# Patient Record
Sex: FEMALE | Race: WHITE | Employment: FULL TIME | URBAN - METROPOLITAN AREA
[De-identification: names, ages, dates, MRNs, and addresses within clinical notes are randomized per-mention and may not be internally consistent; named-entity substitution may affect disease eponyms.]

---

## 2018-03-08 ENCOUNTER — OFFICE VISIT (OUTPATIENT)
Dept: DERMATOLOGY | Age: 48
End: 2018-03-08
Payer: COMMERCIAL

## 2018-03-08 VITALS
BODY MASS INDEX: 29.66 KG/M2 | WEIGHT: 189 LBS | HEART RATE: 75 BPM | HEIGHT: 67 IN | SYSTOLIC BLOOD PRESSURE: 107 MMHG | OXYGEN SATURATION: 99 % | DIASTOLIC BLOOD PRESSURE: 65 MMHG

## 2018-03-08 DIAGNOSIS — L82.1 SEBORRHEIC KERATOSES: ICD-10-CM

## 2018-03-08 DIAGNOSIS — L57.0 KERATOSIS, ACTINIC: Primary | ICD-10-CM

## 2018-03-08 PROCEDURE — 17000 DESTRUCT PREMALG LESION: CPT | Performed by: DERMATOLOGY

## 2018-03-08 PROCEDURE — G8417 CALC BMI ABV UP PARAM F/U: HCPCS | Performed by: DERMATOLOGY

## 2018-03-08 PROCEDURE — 1036F TOBACCO NON-USER: CPT | Performed by: DERMATOLOGY

## 2018-03-08 PROCEDURE — 17003 DESTRUCT PREMALG LES 2-14: CPT | Performed by: DERMATOLOGY

## 2018-03-08 PROCEDURE — G8482 FLU IMMUNIZE ORDER/ADMIN: HCPCS | Performed by: DERMATOLOGY

## 2018-03-08 PROCEDURE — G8427 DOCREV CUR MEDS BY ELIG CLIN: HCPCS | Performed by: DERMATOLOGY

## 2018-03-08 PROCEDURE — 99203 OFFICE O/P NEW LOW 30 MIN: CPT | Performed by: DERMATOLOGY

## 2018-03-08 NOTE — PATIENT INSTRUCTIONS
an antibiotic ointment (such as polysporin) and a Band-Aid if needed. If a large blister develops it is ok to use a clean needle to gently pop the blister. Please call our office with any concerns at [phone]. Sun Protection     There are two types of sun rays that are harmful to the skin. UVA rays cause skin aging and skin cancer, such as melanoma. UVB rays cause sunburns, cataracts, and also contribute to skin cancer. The American-Academy of Dermat[employer] recommends that all kids wear a broad spectrum, waterproof sunscreen with a Sun Protection Factor (SPF) of 30 or higher. It is important to check the ingredient label to be sure the sunscreen will protect the skin from both UVA and UVB sunrays. Your sunscreen should contain at least one of the following ingredients: titanium dioxide, zinc oxide, or avobenzone. Sunscreen will not be effective unless it is applied to all exposed skin. Sunscreens work best if they are applied 30 minutes before sun exposure. They should be reapplied every 2 hours and after any water exposure. Sunscreen is not perfect. It is important to use other methods to protect the skin from sun exposure also. Wear hats, sunglasses and other sun protective clothing when outdoors.   Stay in the shade during the peak hours of sun exposure between 10 AM and 4 PM.

## 2018-03-09 NOTE — PROGRESS NOTES
[facility], and the [facility] have recommended that all patients with AK lesions be evaluated and undergo some form of treatment. Your dermatologist can determine which type of treatment-either alone or in combination-is right for you. SUN PROTECTION AND OBSERVATION  Your dermatologist may recommend that you use a sun block, wear a hat and clothing to prevent sun exposure, and have regular skin examinations. Some AKs go away without further treatment, provided that the skin is not subjected to more sun damage. However, regular examinations will help catch the lesions that need to be treated. LESION-TARGETED THERAPIES   Liquid nitrogen (cryotherapy) destroys AKs by freezing them. This results in blistering and shedding of the AKs. Cryotherapy is the most common treatment when a patient has a few, small AK lesions. Topical chemotherapy is a cream that targets sun-damaged and pre-cancerous cells and destroys them. Cryotherapy    Liquid Nitrogen - \"freeze\" (Cryotherapy)  Your doctor has treated your skin lesions with a very cold substance. The liquid nitrogen is so cold that it may feel like the skin is burning during application. A clear blister or blood blister may form after treatment and may later form a scab. Leave the area alone. Usually this scab will fall of within 1-2 weeks. The area should be kept clean and can be covered with Vaseline or an antibiotic ointment (such as polysporin) and a Band-Aid if needed. If a large blister develops it is ok to use a clean needle to gently pop the blister. Please call our office with any concerns at [phone]. Sun Protection     There are two types of sun rays that are harmful to the skin. UVA rays cause skin aging and skin cancer, such as melanoma. UVB rays cause sunburns, cataracts, and also contribute to skin cancer.     The American-Academy of Dermatology recommends that all kids wear a broad spectrum, waterproof sunscreen

## 2018-04-12 ENCOUNTER — HOSPITAL ENCOUNTER (EMERGENCY)
Age: 48
Discharge: HOME | End: 2018-04-12
Attending: EMERGENCY MEDICINE
Payer: COMMERCIAL

## 2018-04-12 VITALS
HEART RATE: 80 BPM | HEIGHT: 67 IN | DIASTOLIC BLOOD PRESSURE: 65 MMHG | RESPIRATION RATE: 16 BRPM | BODY MASS INDEX: 29.03 KG/M2 | WEIGHT: 185 LBS | TEMPERATURE: 98.8 F | OXYGEN SATURATION: 97 % | SYSTOLIC BLOOD PRESSURE: 105 MMHG

## 2018-04-12 DIAGNOSIS — B34.9 VIRAL SYNDROME: Primary | ICD-10-CM

## 2018-04-12 LAB
-: ABNORMAL
ABSOLUTE EOS #: 0.06 K/UL (ref 0–0.44)
ABSOLUTE IMMATURE GRANULOCYTE: 0.09 K/UL (ref 0–0.3)
ABSOLUTE LYMPH #: 1.72 K/UL (ref 1.1–3.7)
ABSOLUTE MONO #: 0.98 K/UL (ref 0.1–1.2)
ALBUMIN SERPL-MCNC: 3.9 G/DL (ref 3.5–5.2)
ALBUMIN/GLOBULIN RATIO: 0.9 (ref 1–2.5)
ALP BLD-CCNC: 136 U/L (ref 35–104)
ALT SERPL-CCNC: 70 U/L (ref 5–33)
AMORPHOUS: ABNORMAL
ANION GAP SERPL CALCULATED.3IONS-SCNC: 13 MMOL/L (ref 9–17)
APPEARANCE CSF: CLEAR
AST SERPL-CCNC: 43 U/L
BACTERIA: ABNORMAL
BASOPHILS # BLD: 0 % (ref 0–2)
BASOPHILS ABSOLUTE: 0.03 K/UL (ref 0–0.2)
BILIRUB SERPL-MCNC: 1.02 MG/DL (ref 0.3–1.2)
BILIRUBIN DIRECT: 0.21 MG/DL
BILIRUBIN URINE: NEGATIVE
BILIRUBIN, INDIRECT: 0.81 MG/DL (ref 0–1)
BUN BLDV-MCNC: 10 MG/DL (ref 6–20)
BUN/CREAT BLD: ABNORMAL (ref 9–20)
C-REACTIVE PROTEIN: 80 MG/L (ref 0–5)
CALCIUM SERPL-MCNC: 8.9 MG/DL (ref 8.6–10.4)
CASTS UA: ABNORMAL /LPF (ref 0–8)
CHLORIDE BLD-SCNC: 96 MMOL/L (ref 98–107)
CO2: 23 MMOL/L (ref 20–31)
COLOR: ABNORMAL
CREAT SERPL-MCNC: 0.65 MG/DL (ref 0.5–0.9)
CRYPTOCOCCUS NEOFORMANS/GATTI CSF FILM ARR.: NOT DETECTED
CRYSTALS, UA: ABNORMAL /HPF
CULTURE: NORMAL
CULTURE: NORMAL
CYTOMEGALOVIRUS (CMV) CSF FILM ARRAY: NOT DETECTED
DIFFERENTIAL TYPE: ABNORMAL
DIRECT EXAM: NORMAL
ENTEROVIRUS CSF FILM ARRAY: NOT DETECTED
EOSINOPHILS RELATIVE PERCENT: 1 % (ref 1–4)
EPITHELIAL CELLS UA: ABNORMAL /HPF (ref 0–5)
ESCHERICHIA COLI K1 CSF FILM ARRAY: NOT DETECTED
GFR AFRICAN AMERICAN: >60 ML/MIN
GFR NON-AFRICAN AMERICAN: >60 ML/MIN
GFR SERPL CREATININE-BSD FRML MDRD: ABNORMAL ML/MIN/{1.73_M2}
GFR SERPL CREATININE-BSD FRML MDRD: ABNORMAL ML/MIN/{1.73_M2}
GLOBULIN: ABNORMAL G/DL (ref 1.5–3.8)
GLUCOSE BLD-MCNC: 103 MG/DL (ref 70–99)
GLUCOSE URINE: NEGATIVE
GLUCOSE, CSF: 58 MG/DL (ref 40–70)
HAEMOPHILUS INFLUENZA CSF FILM ARRAY: NOT DETECTED
HCG(URINE) PREGNANCY TEST: NEGATIVE
HCT VFR BLD CALC: 39 % (ref 36.3–47.1)
HEMOGLOBIN: 12.8 G/DL (ref 11.9–15.1)
HHV-6 (HERPESVIRUS 6) CSF FILM ARRAY: NOT DETECTED
HSV-1 CSF FILM ARRAY: NOT DETECTED
HSV-2 CSF FILM ARRAY: NOT DETECTED
IMMATURE GRANULOCYTES: 1 %
KETONES, URINE: ABNORMAL
LEUKOCYTE ESTERASE, URINE: ABNORMAL
LISTERIA MONOCYTOGENES CSF FILM ARRAY: NOT DETECTED
LYMPHOCYTES # BLD: 17 % (ref 24–43)
Lab: NORMAL
MAGNESIUM: 2.3 MG/DL (ref 1.6–2.6)
MCH RBC QN AUTO: 28.6 PG (ref 25.2–33.5)
MCHC RBC AUTO-ENTMCNC: 32.8 G/DL (ref 28.4–34.8)
MCV RBC AUTO: 87.2 FL (ref 82.6–102.9)
MONOCYTES # BLD: 10 % (ref 3–12)
MUCUS: ABNORMAL
MYOGLOBIN: <21 NG/ML (ref 25–58)
NEISSERIA MENIGITIDIS CSF FILM ARRAY: NOT DETECTED
NITRITE, URINE: NEGATIVE
NRBC AUTOMATED: 0 PER 100 WBC
OTHER OBSERVATIONS UA: ABNORMAL
PARECHOVIRUS CSF FILM ARRAY: NOT DETECTED
PDW BLD-RTO: 11.6 % (ref 11.8–14.4)
PH UA: 5 (ref 5–8)
PLATELET # BLD: 289 K/UL (ref 138–453)
PLATELET ESTIMATE: ABNORMAL
PMV BLD AUTO: 10 FL (ref 8.1–13.5)
POTASSIUM SERPL-SCNC: 4 MMOL/L (ref 3.7–5.3)
PROTEIN CSF: 31.4 MG/DL (ref 15–45)
PROTEIN UA: ABNORMAL
RBC # BLD: 4.47 M/UL (ref 3.95–5.11)
RBC # BLD: ABNORMAL 10*6/UL
RBC CSF: 2 /MM3
RBC UA: ABNORMAL /HPF (ref 0–4)
RENAL EPITHELIAL, UA: ABNORMAL /HPF
SEG NEUTROPHILS: 71 % (ref 36–65)
SEGMENTED NEUTROPHILS ABSOLUTE COUNT: 7.11 K/UL (ref 1.5–8.1)
SODIUM BLD-SCNC: 132 MMOL/L (ref 135–144)
SOURCE: NORMAL
SPECIFIC GRAVITY UA: 1.02 (ref 1–1.03)
SPECIMEN DESCRIPTION: NORMAL
STATUS: NORMAL
STREPTOCOCCUS AGALACTIAE CSF FILM ARRAY: NOT DETECTED
STREPTOCOCCUS PNEUMONIAE CSF FILM ARRAY: NOT DETECTED
SUPERNAT COLOR CSF: ABNORMAL
TOTAL CK: 45 U/L (ref 26–192)
TOTAL PROTEIN: 8.4 G/DL (ref 6.4–8.3)
TRICHOMONAS: ABNORMAL
TUBE NUMBER CSF: 4
TURBIDITY: CLEAR
URINE HGB: ABNORMAL
UROBILINOGEN, URINE: NORMAL
VARICELLA-ZOSTER CSF FILM ARRAY: NOT DETECTED
VOLUME CSF: 6
WBC # BLD: 10 K/UL (ref 3.5–11.3)
WBC # BLD: ABNORMAL 10*3/UL
WBC CSF: 0 /MM3
WBC UA: ABNORMAL /HPF (ref 0–5)
XANTHOCHROMIA: ABNORMAL
YEAST: ABNORMAL

## 2018-04-12 PROCEDURE — 86140 C-REACTIVE PROTEIN: CPT

## 2018-04-12 PROCEDURE — 80076 HEPATIC FUNCTION PANEL: CPT

## 2018-04-12 PROCEDURE — 81001 URINALYSIS AUTO W/SCOPE: CPT

## 2018-04-12 PROCEDURE — 87070 CULTURE OTHR SPECIMN AEROBIC: CPT

## 2018-04-12 PROCEDURE — 86654 ENCEPHALTIS WEST EQNE ANTBDY: CPT

## 2018-04-12 PROCEDURE — 86653 ENCEPHALTIS ST LOUIS ANTBODY: CPT

## 2018-04-12 PROCEDURE — 86788 WEST NILE VIRUS AB IGM: CPT

## 2018-04-12 PROCEDURE — 84157 ASSAY OF PROTEIN OTHER: CPT

## 2018-04-12 PROCEDURE — 89050 BODY FLUID CELL COUNT: CPT

## 2018-04-12 PROCEDURE — 86652 ENCEPHALTIS EAST EQNE ANBDY: CPT

## 2018-04-12 PROCEDURE — 2580000003 HC RX 258: Performed by: EMERGENCY MEDICINE

## 2018-04-12 PROCEDURE — 86789 WEST NILE VIRUS ANTIBODY: CPT

## 2018-04-12 PROCEDURE — 85025 COMPLETE CBC W/AUTO DIFF WBC: CPT

## 2018-04-12 PROCEDURE — 83874 ASSAY OF MYOGLOBIN: CPT

## 2018-04-12 PROCEDURE — 87015 SPECIMEN INFECT AGNT CONCNTJ: CPT

## 2018-04-12 PROCEDURE — 80048 BASIC METABOLIC PNL TOTAL CA: CPT

## 2018-04-12 PROCEDURE — 96374 THER/PROPH/DIAG INJ IV PUSH: CPT

## 2018-04-12 PROCEDURE — 2500000003 HC RX 250 WO HCPCS: Performed by: EMERGENCY MEDICINE

## 2018-04-12 PROCEDURE — 83735 ASSAY OF MAGNESIUM: CPT

## 2018-04-12 PROCEDURE — 84703 CHORIONIC GONADOTROPIN ASSAY: CPT

## 2018-04-12 PROCEDURE — 87205 SMEAR GRAM STAIN: CPT

## 2018-04-12 PROCEDURE — 96361 HYDRATE IV INFUSION ADD-ON: CPT

## 2018-04-12 PROCEDURE — 62270 DX LMBR SPI PNXR: CPT

## 2018-04-12 PROCEDURE — 6360000002 HC RX W HCPCS: Performed by: EMERGENCY MEDICINE

## 2018-04-12 PROCEDURE — 86651 ENCEPHALITIS CALIFORN ANTBDY: CPT

## 2018-04-12 PROCEDURE — 82550 ASSAY OF CK (CPK): CPT

## 2018-04-12 PROCEDURE — 82945 GLUCOSE OTHER FLUID: CPT

## 2018-04-12 PROCEDURE — S0028 INJECTION, FAMOTIDINE, 20 MG: HCPCS | Performed by: EMERGENCY MEDICINE

## 2018-04-12 PROCEDURE — 99283 EMERGENCY DEPT VISIT LOW MDM: CPT

## 2018-04-12 PROCEDURE — 6370000000 HC RX 637 (ALT 250 FOR IP): Performed by: EMERGENCY MEDICINE

## 2018-04-12 PROCEDURE — 87483 CNS DNA AMP PROBE TYPE 12-25: CPT

## 2018-04-12 RX ORDER — IBUPROFEN 800 MG/1
800 TABLET ORAL ONCE
Status: COMPLETED | OUTPATIENT
Start: 2018-04-12 | End: 2018-04-12

## 2018-04-12 RX ORDER — ONDANSETRON 4 MG/1
4 TABLET, FILM COATED ORAL EVERY 8 HOURS PRN
Qty: 5 TABLET | Refills: 0 | Status: SHIPPED | OUTPATIENT
Start: 2018-04-12 | End: 2018-05-11 | Stop reason: ALTCHOICE

## 2018-04-12 RX ORDER — IBUPROFEN 600 MG/1
600 TABLET ORAL EVERY 6 HOURS PRN
Qty: 30 TABLET | Refills: 0 | Status: SHIPPED | OUTPATIENT
Start: 2018-04-12 | End: 2020-10-01 | Stop reason: CLARIF

## 2018-04-12 RX ORDER — 0.9 % SODIUM CHLORIDE 0.9 %
1000 INTRAVENOUS SOLUTION INTRAVENOUS ONCE
Status: COMPLETED | OUTPATIENT
Start: 2018-04-12 | End: 2018-04-12

## 2018-04-12 RX ORDER — LIDOCAINE HYDROCHLORIDE 10 MG/ML
INJECTION, SOLUTION INFILTRATION; PERINEURAL
Status: DISCONTINUED
Start: 2018-04-12 | End: 2018-04-12 | Stop reason: HOSPADM

## 2018-04-12 RX ORDER — ONDANSETRON 4 MG/1
4 TABLET, FILM COATED ORAL ONCE
Status: COMPLETED | OUTPATIENT
Start: 2018-04-12 | End: 2018-04-12

## 2018-04-12 RX ADMIN — IBUPROFEN 800 MG: 800 TABLET ORAL at 13:10

## 2018-04-12 RX ADMIN — SODIUM CHLORIDE 1000 ML: 9 INJECTION, SOLUTION INTRAVENOUS at 13:12

## 2018-04-12 RX ADMIN — FAMOTIDINE 20 MG: 10 INJECTION, SOLUTION INTRAVENOUS at 13:10

## 2018-04-12 RX ADMIN — ONDANSETRON 4 MG: 4 TABLET, FILM COATED ORAL at 13:10

## 2018-04-12 ASSESSMENT — ENCOUNTER SYMPTOMS
VOMITING: 0
COUGH: 0
BACK PAIN: 1
SORE THROAT: 0
ABDOMINAL PAIN: 0
SHORTNESS OF BREATH: 0
RHINORRHEA: 0
NAUSEA: 0

## 2018-04-12 ASSESSMENT — PAIN DESCRIPTION - ORIENTATION: ORIENTATION: RIGHT;LOWER

## 2018-04-12 ASSESSMENT — PAIN DESCRIPTION - DESCRIPTORS: DESCRIPTORS: ACHING;DISCOMFORT

## 2018-04-12 ASSESSMENT — PAIN SCALES - GENERAL
PAINLEVEL_OUTOF10: 3
PAINLEVEL_OUTOF10: 7

## 2018-04-12 ASSESSMENT — PAIN DESCRIPTION - LOCATION: LOCATION: BACK

## 2018-04-12 ASSESSMENT — PAIN DESCRIPTION - PAIN TYPE: TYPE: ACUTE PAIN

## 2018-04-15 LAB
CULTURE: ABNORMAL
CULTURE: ABNORMAL
DIRECT EXAM: ABNORMAL
Lab: ABNORMAL
SPECIMEN DESCRIPTION: ABNORMAL
STATUS: ABNORMAL

## 2018-04-16 LAB
SEND OUT REPORT: NORMAL
TEST NAME: NORMAL

## 2018-05-14 ENCOUNTER — HOSPITAL ENCOUNTER (OUTPATIENT)
Dept: WOMENS IMAGING | Age: 48
Discharge: HOME | End: 2018-05-16
Payer: COMMERCIAL

## 2018-05-14 DIAGNOSIS — Z12.39 BREAST CANCER SCREENING: ICD-10-CM

## 2018-05-14 DIAGNOSIS — Z13.6 SCREENING FOR CARDIOVASCULAR CONDITION: ICD-10-CM

## 2018-05-14 PROCEDURE — 77063 BREAST TOMOSYNTHESIS BI: CPT

## 2018-05-15 ENCOUNTER — HOSPITAL ENCOUNTER (OUTPATIENT)
Age: 48
Discharge: HOME | End: 2018-05-15
Payer: COMMERCIAL

## 2018-05-15 LAB
ALBUMIN SERPL-MCNC: 4.2 G/DL (ref 3.5–5.2)
ALBUMIN/GLOBULIN RATIO: ABNORMAL (ref 1–2.5)
ALP BLD-CCNC: 49 U/L (ref 35–104)
ALT SERPL-CCNC: 19 U/L (ref 5–33)
ANION GAP SERPL CALCULATED.3IONS-SCNC: 10 MMOL/L (ref 9–17)
AST SERPL-CCNC: 18 U/L
BILIRUB SERPL-MCNC: 0.85 MG/DL (ref 0.3–1.2)
BUN BLDV-MCNC: 14 MG/DL (ref 6–20)
BUN/CREAT BLD: ABNORMAL (ref 9–20)
CALCIUM SERPL-MCNC: 8.9 MG/DL (ref 8.6–10.4)
CHLORIDE BLD-SCNC: 104 MMOL/L (ref 98–107)
CHOLESTEROL/HDL RATIO: 2.2
CHOLESTEROL: 134 MG/DL
CO2: 25 MMOL/L (ref 20–31)
CREAT SERPL-MCNC: 0.69 MG/DL (ref 0.5–0.9)
GFR AFRICAN AMERICAN: >60 ML/MIN
GFR NON-AFRICAN AMERICAN: >60 ML/MIN
GFR SERPL CREATININE-BSD FRML MDRD: ABNORMAL ML/MIN/{1.73_M2}
GFR SERPL CREATININE-BSD FRML MDRD: ABNORMAL ML/MIN/{1.73_M2}
GLUCOSE BLD-MCNC: 104 MG/DL (ref 70–99)
HCT VFR BLD CALC: 40 % (ref 36–46)
HDLC SERPL-MCNC: 60 MG/DL
HEMOGLOBIN: 13.6 G/DL (ref 12–16)
LDL CHOLESTEROL: 58 MG/DL (ref 0–130)
MCH RBC QN AUTO: 30 PG (ref 26–34)
MCHC RBC AUTO-ENTMCNC: 33.9 G/DL (ref 31–37)
MCV RBC AUTO: 88.6 FL (ref 80–100)
NRBC AUTOMATED: NORMAL PER 100 WBC
PDW BLD-RTO: 13.7 % (ref 11.5–14.9)
PLATELET # BLD: 190 K/UL (ref 150–450)
PMV BLD AUTO: 8.3 FL (ref 6–12)
POTASSIUM SERPL-SCNC: 4.3 MMOL/L (ref 3.7–5.3)
RBC # BLD: 4.52 M/UL (ref 4–5.2)
SODIUM BLD-SCNC: 139 MMOL/L (ref 135–144)
TOTAL PROTEIN: 7.1 G/DL (ref 6.4–8.3)
TRIGL SERPL-MCNC: 82 MG/DL
VLDLC SERPL CALC-MCNC: NORMAL MG/DL (ref 1–30)
WBC # BLD: 5.3 K/UL (ref 3.5–11)

## 2018-05-15 PROCEDURE — 80053 COMPREHEN METABOLIC PANEL: CPT

## 2018-05-15 PROCEDURE — 36415 COLL VENOUS BLD VENIPUNCTURE: CPT

## 2018-05-15 PROCEDURE — 80061 LIPID PANEL: CPT

## 2018-05-15 PROCEDURE — 85027 COMPLETE CBC AUTOMATED: CPT

## 2019-05-14 ENCOUNTER — HOSPITAL ENCOUNTER (OUTPATIENT)
Age: 49
Setting detail: SPECIMEN
Discharge: HOME | End: 2019-05-14
Payer: COMMERCIAL

## 2019-05-15 LAB
C DIFF AG + TOXIN: NEGATIVE
CAMPYLOBACTER PCR: NORMAL
E COLI ENTEROTOXIGENIC PCR: NORMAL
PLESIOMONAS SHIGELLOIDES PCR: NORMAL
SALMONELLA PCR: NORMAL
SHIGATOXIN GENE PCR: NORMAL
SHIGELLA SP PCR: NORMAL
SPECIMEN DESCRIPTION: NORMAL
SPECIMEN DESCRIPTION: NORMAL
VIBRIO PCR: NORMAL
YERSINIA ENTEROCOLITICA PCR: NORMAL

## 2020-07-21 ENCOUNTER — HOSPITAL ENCOUNTER (OUTPATIENT)
Facility: CLINIC | Age: 50
Discharge: HOME | End: 2020-07-23
Payer: COMMERCIAL

## 2020-07-21 ENCOUNTER — HOSPITAL ENCOUNTER (OUTPATIENT)
Age: 50
Setting detail: SPECIMEN
Discharge: HOME | End: 2020-07-21
Payer: COMMERCIAL

## 2020-07-21 ENCOUNTER — HOSPITAL ENCOUNTER (OUTPATIENT)
Dept: GENERAL RADIOLOGY | Facility: CLINIC | Age: 50
Discharge: HOME | End: 2020-07-23
Payer: COMMERCIAL

## 2020-07-21 LAB
ABSOLUTE EOS #: 0.1 K/UL (ref 0–0.44)
ABSOLUTE IMMATURE GRANULOCYTE: <0.03 K/UL (ref 0–0.3)
ABSOLUTE LYMPH #: 1.92 K/UL (ref 1.1–3.7)
ABSOLUTE MONO #: 0.47 K/UL (ref 0.1–1.2)
ALBUMIN SERPL-MCNC: 4.4 G/DL (ref 3.5–5.2)
ALBUMIN/GLOBULIN RATIO: 1.6 (ref 1–2.5)
ALP BLD-CCNC: 52 U/L (ref 35–104)
ALT SERPL-CCNC: 15 U/L (ref 5–33)
ANION GAP SERPL CALCULATED.3IONS-SCNC: 10 MMOL/L (ref 9–17)
AST SERPL-CCNC: 19 U/L
BASOPHILS # BLD: 1 % (ref 0–2)
BASOPHILS ABSOLUTE: 0.04 K/UL (ref 0–0.2)
BILIRUB SERPL-MCNC: 1.04 MG/DL (ref 0.3–1.2)
BUN BLDV-MCNC: 15 MG/DL (ref 6–20)
BUN/CREAT BLD: NORMAL (ref 9–20)
C-REACTIVE PROTEIN: 0.8 MG/L (ref 0–5)
CALCIUM SERPL-MCNC: 9.1 MG/DL (ref 8.6–10.4)
CHLORIDE BLD-SCNC: 105 MMOL/L (ref 98–107)
CHOLESTEROL, FASTING: 143 MG/DL
CHOLESTEROL/HDL RATIO: 2.6
CO2: 23 MMOL/L (ref 20–31)
CREAT SERPL-MCNC: 0.71 MG/DL (ref 0.5–0.9)
DIFFERENTIAL TYPE: NORMAL
EOSINOPHILS RELATIVE PERCENT: 2 % (ref 1–4)
ESTIMATED AVERAGE GLUCOSE: 114 MG/DL
GFR AFRICAN AMERICAN: >60 ML/MIN
GFR NON-AFRICAN AMERICAN: >60 ML/MIN
GFR SERPL CREATININE-BSD FRML MDRD: NORMAL ML/MIN/{1.73_M2}
GFR SERPL CREATININE-BSD FRML MDRD: NORMAL ML/MIN/{1.73_M2}
GLUCOSE BLD-MCNC: 92 MG/DL (ref 70–99)
HBA1C MFR BLD: 5.6 % (ref 4–6)
HCT VFR BLD CALC: 44.2 % (ref 36.3–47.1)
HDLC SERPL-MCNC: 55 MG/DL
HEMOGLOBIN: 14.5 G/DL (ref 11.9–15.1)
IMMATURE GRANULOCYTES: 0 %
LDL CHOLESTEROL: 72 MG/DL (ref 0–130)
LYMPHOCYTES # BLD: 35 % (ref 24–43)
MCH RBC QN AUTO: 29.8 PG (ref 25.2–33.5)
MCHC RBC AUTO-ENTMCNC: 32.8 G/DL (ref 28.4–34.8)
MCV RBC AUTO: 90.9 FL (ref 82.6–102.9)
MONOCYTES # BLD: 9 % (ref 3–12)
NRBC AUTOMATED: 0 PER 100 WBC
PDW BLD-RTO: 11.9 % (ref 11.8–14.4)
PLATELET # BLD: 214 K/UL (ref 138–453)
PLATELET ESTIMATE: NORMAL
PMV BLD AUTO: 10.7 FL (ref 8.1–13.5)
POTASSIUM SERPL-SCNC: 4.7 MMOL/L (ref 3.7–5.3)
RBC # BLD: 4.86 M/UL (ref 3.95–5.11)
RBC # BLD: NORMAL 10*6/UL
RHEUMATOID FACTOR: 10.8 IU/ML
SEDIMENTATION RATE, ERYTHROCYTE: 16 MM (ref 0–30)
SEG NEUTROPHILS: 53 % (ref 36–65)
SEGMENTED NEUTROPHILS ABSOLUTE COUNT: 2.93 K/UL (ref 1.5–8.1)
SODIUM BLD-SCNC: 138 MMOL/L (ref 135–144)
TOTAL PROTEIN: 7.2 G/DL (ref 6.4–8.3)
TRIGLYCERIDE, FASTING: 81 MG/DL
VLDLC SERPL CALC-MCNC: NORMAL MG/DL (ref 1–30)
WBC # BLD: 5.5 K/UL (ref 3.5–11.3)
WBC # BLD: NORMAL 10*3/UL

## 2020-07-21 PROCEDURE — 73030 X-RAY EXAM OF SHOULDER: CPT

## 2020-07-22 LAB — ANTI-NUCLEAR ANTIBODY (ANA): NEGATIVE

## 2020-07-23 LAB — LYME ANTIBODY: 0.97

## 2020-07-25 LAB
LYME IGM WB: NEGATIVE
LYME WESTERN BLOT IGG: NEGATIVE

## 2020-08-11 ENCOUNTER — OFFICE VISIT (OUTPATIENT)
Dept: SURGERY | Age: 50
End: 2020-08-11

## 2020-08-11 VITALS
BODY MASS INDEX: 28.25 KG/M2 | WEIGHT: 180 LBS | SYSTOLIC BLOOD PRESSURE: 107 MMHG | DIASTOLIC BLOOD PRESSURE: 71 MMHG | HEIGHT: 67 IN

## 2020-08-11 PROCEDURE — 99999 PR OFFICE/OUTPT VISIT,PROCEDURE ONLY: CPT | Performed by: SURGERY

## 2020-08-11 RX ORDER — SODIUM, POTASSIUM,MAG SULFATES 17.5-3.13G
SOLUTION, RECONSTITUTED, ORAL ORAL
Qty: 1 KIT | Refills: 0 | Status: SHIPPED | OUTPATIENT
Start: 2020-08-11 | End: 2020-10-01 | Stop reason: CLARIF

## 2020-08-11 ASSESSMENT — ENCOUNTER SYMPTOMS
RESPIRATORY NEGATIVE: 1
DIARRHEA: 1

## 2020-08-11 NOTE — H&P
Dashawn De Los Santos [address] [place], [address]        Loc Jack   47 y.o.  1970  [id number]     PCP: ANDREW Sy - CNP  Referring Provider: Danielle Prasad*   Author:   Dr. Alvarez Corrales MD     Reason for Visit:  Chief Complaint   Patient presents with    New Patient     colon cancer screening       HPI:  Loc Jack presents in evaluation for Screening Colonoscopy. Denies any new colon-related problems. She does report a longstanding history of IBS with limited diarrhea each morning. Prior colonoscopy was 10+ years ago. Review of Systems   Constitutional: Negative. Respiratory: Negative. Cardiovascular: Negative. Gastrointestinal: Positive for diarrhea. Musculoskeletal: Negative. Neurological: Negative. Allergies: Allergies   Allergen Reactions    Pcn [Penicillins]        Current Medications:  Current Outpatient Medications   Medication Sig Dispense Refill    SUPREP BOWEL PREP KIT 17.5-3.13-1.6 GM/177ML SOLN Take as directed - dispense one Kit 1 kit 0    albuterol sulfate HFA (PROVENTIL HFA) 108 (90 Base) MCG/ACT inhaler Inhale 2 puffs into the lungs every 4 hours as needed for Wheezing 1 Inhaler 1    cyclobenzaprine (FLEXERIL) 5 MG tablet 1 - 2 po tid prn muscle spasm (Patient not taking: Reported on 7/20/2020) 30 tablet 0    ibuprofen (ADVIL;MOTRIN) 600 MG tablet Take 1 tablet by mouth every 6 hours as needed for Pain (Patient not taking: Reported on 8/11/2020) 30 tablet 0     No current facility-administered medications for this visit. Problem List:  Patient Active Problem List   Diagnosis    Depression    Irritable bowel syndrome with diarrhea    Overweight (BMI 25.0-29. 9)    Mild persistent asthma without complication        Histories:  Past Medical History:   Diagnosis Date    Asthma 12/16/2014    Depression     Irritable bowel syndrome with diarrhea 6/29/2016 No family history on file. No past surgical history on file. Social History     Socioeconomic History    Marital status:      Spouse name: None    Number of children: None    Years of education: None    Highest education level: None   Occupational History    None   Social Needs    Financial resource strain: Not hard at all   Lamin-Lindsey insecurity     Worry: Never true     Inability: Never true    Transportation needs     Medical: No     Non-medical: No   Tobacco Use    Smoking status: Never Smoker    Smokeless tobacco: Never Used   Substance and Sexual Activity    Alcohol use: No    Drug use: No    Sexual activity: None   Lifestyle    Physical activity     Days per week: None     Minutes per session: None    Stress: None   Relationships    Social connections     Talks on phone: None     Gets together: None     Attends Tenriism service: None     Active member of club or organization: None     Attends meetings of clubs or organizations: None     Relationship status: None    Intimate partner violence     Fear of current or ex partner: None     Emotionally abused: None     Physically abused: None     Forced sexual activity: None   Other Topics Concern    None   Social History Narrative    None        Physical Examination:  /71 (Site: Left Upper Arm, Position: Sitting, Cuff Size: Medium Adult)   Ht 5' 7\" (1.702 m)   Wt 180 lb (81.6 kg)   BMI 28.19 kg/m²     Neck: Supple  Respiratory:  Lungs are clear to auscultation  Cardiovascular:  Normal Rate and Rhythm  Gastrointestinal:    Abdomen:  Soft, non-tender, no masses   Rectum/Anus:  Deferred to procedure  Integumentary:  Warm and dry  Neurologic:  Alert     Impression and Plan:    Diagnoses:     Diagnosis Orders   1. Encounter for screening colonoscopy        Plan:  Colonoscopy.   The risks, benefits, options and potential complications of the procedure were discussed in detail with the patient and they agreed to proceed and consent was

## 2020-08-24 ENCOUNTER — OFFICE VISIT (OUTPATIENT)
Dept: ORTHOPEDIC SURGERY | Age: 50
End: 2020-08-24
Payer: COMMERCIAL

## 2020-08-24 VITALS — TEMPERATURE: 97.5 F | HEIGHT: 67 IN | BODY MASS INDEX: 28.25 KG/M2 | WEIGHT: 180 LBS

## 2020-08-24 PROCEDURE — 20610 DRAIN/INJ JOINT/BURSA W/O US: CPT | Performed by: ORTHOPAEDIC SURGERY

## 2020-08-24 PROCEDURE — 99203 OFFICE O/P NEW LOW 30 MIN: CPT | Performed by: ORTHOPAEDIC SURGERY

## 2020-08-24 RX ORDER — LIDOCAINE HYDROCHLORIDE 10 MG/ML
3 INJECTION, SOLUTION INFILTRATION; PERINEURAL ONCE
Status: COMPLETED | OUTPATIENT
Start: 2020-08-24 | End: 2020-08-24

## 2020-08-24 RX ORDER — TRIAMCINOLONE ACETONIDE 40 MG/ML
40 INJECTION, SUSPENSION INTRA-ARTICULAR; INTRAMUSCULAR ONCE
Status: COMPLETED | OUTPATIENT
Start: 2020-08-24 | End: 2020-08-24

## 2020-08-24 RX ADMIN — LIDOCAINE HYDROCHLORIDE 3 ML: 10 INJECTION, SOLUTION INFILTRATION; PERINEURAL at 16:45

## 2020-08-24 RX ADMIN — TRIAMCINOLONE ACETONIDE 40 MG: 40 INJECTION, SUSPENSION INTRA-ARTICULAR; INTRAMUSCULAR at 16:45

## 2020-08-26 ENCOUNTER — HOSPITAL ENCOUNTER (OUTPATIENT)
Dept: PHYSICAL THERAPY | Age: 50
Setting detail: THERAPIES SERIES
Discharge: HOME | End: 2020-08-26
Payer: COMMERCIAL

## 2020-08-26 PROCEDURE — 97162 PT EVAL MOD COMPLEX 30 MIN: CPT

## 2020-08-26 PROCEDURE — 97110 THERAPEUTIC EXERCISES: CPT

## 2020-08-26 ASSESSMENT — PAIN DESCRIPTION - DESCRIPTORS: DESCRIPTORS: CONSTANT;ACHING;NUMBNESS;TINGLING

## 2020-08-26 ASSESSMENT — PAIN DESCRIPTION - PAIN TYPE: TYPE: ACUTE PAIN

## 2020-08-26 ASSESSMENT — PAIN DESCRIPTION - LOCATION: LOCATION: ELBOW;SHOULDER

## 2020-08-26 ASSESSMENT — 9 HOLE PEG TEST
TESTTIME_SECONDS: 19
TESTTIME_SECONDS: 17

## 2020-08-26 ASSESSMENT — PAIN DESCRIPTION - ONSET: ONSET: SUDDEN

## 2020-08-26 ASSESSMENT — PAIN DESCRIPTION - PROGRESSION: CLINICAL_PROGRESSION: GRADUALLY IMPROVING

## 2020-08-26 ASSESSMENT — PAIN DESCRIPTION - ORIENTATION: ORIENTATION: RIGHT

## 2020-08-26 ASSESSMENT — PAIN SCALES - GENERAL: PAINLEVEL_OUTOF10: 2

## 2020-08-26 NOTE — PROGRESS NOTES
Physical Therapy  Initial Assessment  Date: 2020  Patient Name: Celso Archibald  MRN: [id number]  : 1970     Treatment Diagnosis: Pain M25.511 M25.521   Stiffness M25.611   Weakness M62.511  M62.541   Tingling/ numbness R20.2  Subjective   General  Chart Reviewed: Yes  Patient assessed for rehabilitation services?: Yes  Additional Pertinent Hx: Being tested for connective tissue disorder  Referring Practitioner: Dr. Colby Crystal  Referral Date : 20  Diagnosis: Right rotator cuff tendinits M75.81  Follows Commands: Within Functional Limits  Other (Comment): BREEZY Gayle Dr's appt. General Comment  Comments: Woke up with pain on R shoulder/ elbow, states she's very active. Can't pinpoint a specific activity that could cause her problem. PT Visit Information  Onset Date: 20  PT Insurance Information: [employer] - 20 visits  Total # of Visits Approved: 12  Total # of Visits to Date: 1  Subjective  Subjective: Complains of pain and limited motion of R shoulder, can't lift arm up, can't go back far, difficulty dressing. Complains of R elbow pain, numbness/ tingling on ulnar hand  Pain Screening  Patient Currently in Pain: Yes  Pain Assessment  Pain Assessment: 0-10  Pain Level: 2(can go up to 7/10 with movement)  Pain Type: Acute pain  Pain Location: Elbow; Shoulder  Pain Orientation: Right  Pain Descriptors: Constant; Aching;Numbness;Tingling  Pain Onset: Sudden  Clinical Progression: Gradually improving(shoulder - improving since the shot 2 days ago; elbow - no change)  Non-Pharmaceutical Pain Intervention(s): (has not tried anything to help relieve the pain)  Response to Pain Intervention: None  Vital Signs  Patient Currently in Pain: Yes    Vision/Hearing  Vision  Vision: (wears glasses)  Hearing  Hearing: Within functional limits  Social/Functional History  Social/Functional History  Lives With: Spouse  Type of Home: House  ADL Assistance: Independent(occasionally asks her  to pull her shirt off)  Homemaking Responsibilities: Yes  Active : Yes  Occupation: Full time employment  Type of occupation: Teacher  Leisure & Hobbies: Horseback riding ( hard to put on saddle ), moving hay cristo  Additional Comments: Patient is L handed  Objective     Observation/Palpation  Palpation: Nontender upon palpation of R shoulder, elbow    AROM RUE (degrees)  RUE AROM : Exceptions  R Shoulder Flexion 0-180: 0 / 125  R Shoulder Extension 0-45: 0 / 55  R Shoulder ABduction 0-180: 0 / 120, slow movement  R Shoulder Int Rotation  0-70: 0 / 55 in 90 ABD, thumb on contralateral scapula vertebral border  R Shoulder Ext Rotation 0-90: 0 / 55 in 90 ABD , fingers on C7  R Elbow Flexion 0-145: WNL  R Forearm Supination  0-90: WNL  R Wrist Flexion 0-80: WNL  R Wrist Extension 0-70: WNL  AROM LUE (degrees)  LUE AROM : WNL  Spine  Cervical: WFL, limited with SB, rotation - reports crepitation    Strength RUE  Strength RUE: Exception  Comment: R scapular stabilizers 3+-4/5  R Shoulder Flexion: 4/5;4+/5  R Shoulder ABduction: 4/5  R Shoulder Internal Rotation: 4/5  R Shoulder External Rotation: 4/5  R Elbow Flexion: 4+/5  R Elbow Extension: 4+/5  R Wrist Flexion: 4+/5;5/5  R Wrist Extension: 4+/5;5/5  Strength LUE  Strength LUE: WNL  Comment: Grossly 5/5 overall     Additional Measures  Special Tests: ( - ) R Drop arm test, Rosas - Caden test ( states these were painful before the cortisone shot ); ( + ) sustained R elbow flexion. Impaired R scapulo-humeral rhythm, noted increased scapular elevation with shoulder flex/abduction   Exercises  Exercise 1: INstructed on ulnar nerve protection principle - proper positioning when sleeping,ADL modification to avoid repetitive elbow flexion/ lifting, avoid pressure on medial elbow  Exercise 2: Scaption with thumb up/ down 5x@;  Active shoulder flexion 5x ; posterior capsule stretch 15\"x2 - continue as HEP   Hand Dominance  Hand Dominance: Left  Left Hand Strength -  (lbs)  Handle Setting 2: 65#, 65# - over 90th percentile for age/ gender  Left Hand Strength - Pinch (lbs)  Lateral: 12  Tip: 10  Palmar 3 point: 13  Right Hand Strength -  (lbs)  Handle Setting 2: 40#, 25# - with pain ( Evans 32.5# ) - below 10thpercentile for age/ gender  Right Hand Strength - Pinch (lbs)  Lateral: 9  Tip: 8  Palmar 3 point: 8  Fine Motor Skills  Left 9 Hole Peg Test Time (secs): 17  Right 9 Hole Peg Test Time (secs): 19  Assessment   Conditions Requiring Skilled Therapeutic Intervention  Body structures, Functions, Activity limitations: Decreased functional mobility ; Decreased ADL status; Decreased ROM; Increased pain;Decreased strength  Assessment: Noted full passive ROM of R shoulder in all planes, no capsular tightness noted. Exhibiting increased numbness/ tingling on R ulnar hand with sustained elbow flexion. Weak R scapular stabilizers. Will benefit from manual therapy, therapeutic exercise and pain modalities PRN to R shoulder. Will monitor symptoms on R elbow with ADL modification and positioning. Treatment Diagnosis: Pain M25.511 M25.521   Stiffness M25.611   Weakness M62.511  M62.541   Tingling/ numbness R20.2  Prognosis: Good  Decision Making: Medium Complexity  History: R elbow pain may affect progression of exercises to R shoulder; being tested for connective tissue disorder  Exam: Pain, ROM, MMT, dynamometry, coordination , provocative tests, UEFS  Clinical Presentation: Evolving  Barriers to Learning: None  REQUIRES PT FOLLOW UP: Yes  Treatment Initiated : Therapeutic exercise, instruction in HEP/ nerve protection. Copy on file for written instructions of HEP. good understanding.  Discussed treatment plan   Plan   Plan  Times per week: 2x/wk for 12 visits ( patient would like to limit visits as she has only 20 vs/ year )  Specific instructions for Next Treatment: PROM/scapular stabilization ex, RC strengthening, avoid repetitive elbow flexion; ice/IFC to R shoulder PRN  Current Treatment Recommendations: Strengthening, Home Exercise Program, ROM, Safety Education & Training, Patient/Caregiver Education & Training, Manual Therapy - Joint Manipulation, Modalities, Positioning  Plan Comment: Initiate treatment plan  OutComes Score  UEFS Score: 48.75 (08/26/20 1500)    Goals  Short term goals  Time Frame for Short term goals: 6 visits  Short term goal 1: Decrease pain to 1-2/10 to improve sleep and tolerance to daily activities  Short term goal 2: Improve R shoulder ROM by 10 degrees to improve dressing, performance of ADL's  Short term goal 3:  Independent with HEP/ nerve protection  Long term goals  Time Frame for Long term goals : 12 visits  Long term goal 1: Improve strength of RUE to 5/5 for safe lifting and carrying and improve confidence in using her RUE  Long term goal 2: Improve R / prehension strength to be able to grasp objects/ open jars without difficulty  Long term goal 3: Improve function in UEFS by 10 points from 48.75 = 39% disability  Patient Goals   Patient goals : \" No pain\"   Therapy Time   Individual Concurrent Group Co-treatment   Time In 1500         Time Out 1605         Minutes 65         Timed Code Treatment Minutes: 20 Minutes     Treatment Charges: Minutes Units   []  Ultrasound     []  Electrical-Stim     []  Iontophoresis     []  Traction     []  Massage       [x]  Eval 40 1   []  Gait     [x]  Ther Exercise 20  1    []  Manual Therapy       []  Ther Activities       []  Aquatics     []  Vasopneumatic Device     []  Neuro Re-Ed       []  Other       Total Treatment Time: [address] PT Electronically signed by Barbara Mcgee PT,CHT on 8/26/2020 at 4:39 PM

## 2020-08-31 NOTE — PROGRESS NOTES
Orthopedic Shoulder Encounter Note     Chief complaint: Right shoulder pain    HPI: Son Messina is a 47 y.o. left-hand dominant female who presents for evaluation of her right shoulder. She has been having pain for about 2 months now. She denies having any precipitating trauma or injury. She states that back when it started she noticed decrease in her range of motion. More recently she has noticed some intermittent numbness in her hand which began about 3 weeks ago. She denies having any outright stiffness or weakness in the shoulder. Previous treatment:    NSAIDs: None    Physical Therapy:  No    Injections: None    Surgeries: None    Review of Systems:     Constitution: no fever or chills   Pain level: 7/10  Musculoskeletal: As noted in the HPI   Neurologic: numbness    Past Medical History  Justino  has a past medical history of Asthma, Depression, and Irritable bowel syndrome with diarrhea. Past Surgical History  Justino  has no past surgical history on file. Current Medications  Current Outpatient Medications   Medication Sig Dispense Refill    SUPREP BOWEL PREP KIT 17.5-3.13-1.6 GM/177ML SOLN Take as directed - dispense one Kit 1 kit 0    cyclobenzaprine (FLEXERIL) 5 MG tablet 1 - 2 po tid prn muscle spasm 30 tablet 0    ibuprofen (ADVIL;MOTRIN) 600 MG tablet Take 1 tablet by mouth every 6 hours as needed for Pain 30 tablet 0    albuterol sulfate HFA (PROVENTIL HFA) 108 (90 Base) MCG/ACT inhaler Inhale 2 puffs into the lungs every 4 hours as needed for Wheezing 1 Inhaler 1     No current facility-administered medications for this visit. Allergies  Allergies have been reviewed. Justino is allergic to pcn [penicillins]. Social History  Justino  reports that she has never smoked. She has never used smokeless tobacco. She reports that she does not drink alcohol or use drugs. Family History  Justino's family history is not on file.      Physical Exam:     Temp 97.5 °F (36.4 °C) (Infrared)   Ht 5' 7\" (1.702 m)   Wt 180 lb (81.6 kg)   BMI 28.19 kg/m²    General Appearance: alert, well appearing, and in no distress  Mental Status: alert, oriented to person, place, and time  Gait: normal    Shoulder:    Skin: warm and dry, no rash or erythema; no swelling or obvious muscular atrophy  Vasculature: 2+ radial pulses bilaterally  Neuro: Sensation grossly intact to light touch diffusely  Tenderness: Tender to palpation over the anterior aspect of the right shoulder    ROM: (Degrees)    Right   A P   Left   A P    Elevation  120 150   Elevation  140   Abduction  105 160   Abduction  130    ER   65 90   ER   70   IR   L1    IR   T10   90 abd/ER      90 abd/ER     90 abd/IR      90 abd/IR     Crepitation  No    Crepitation No  Dyskenesia  No    Dyskenesia No      Muscle strength:    Right       Left    Deltoid   5    Deltoid   5  Supraspinatus  5    Supraspinatus  5  ER   5    ER   5  IR   5    IR   5    Special tests    Right   Rotator Cuff    Left    y   Painful arc    n   n   Pain with ER    n    y   Neer's     n    n   Hawkin's    n    n   Drop Arm    n  n   Lift off/Belly Press   n  n   ER Lag    n          AC Joint  n   AC tenderness   n  n   Cross-chest adduction  n       Labrum/biceps    y (equivocal)  Carmichael's    n   y   Biceps sheer    n      n   Speed's/Yergason's   n    y   Tenderness Biceps Groove  n    n   Gurvinder's    n         Instability  n   Ant Apprehension   n    n   Post Apprehension   n    n   Ant Load shift    n    n   Post Load shift   n   n   Sulcus     n  n   Generalized Laxity   n  n   Relocation test   n  n   Crank test     n  n   Daniel-superior escape  n       Imaging:  Xrays: 4 views of the right shoulder obtained on 8/24/20 were independently reviewed  Indications: Right shoulder pain  Findings:  Normal glenohumeral and acromioclavicular joint spaces with a type 2 acromion.   Impression: Normal right shoulder radiograph    Impression/Plan:     Justino Ray Mix is a 47 y.o. old female with right shoulder pain that is consistent with rotator cuff and biceps tendinitis. We discussed treatment options available to her, including nonoperative and operative intervention. At this time I believe she will benefit from conservative management. Consequently, a prescription for therapy was provided. We also discussed use of a prescription NSAID versus a cortisone injection and at this time she has elected to proceed with a cortisone injection which was administered as outlined below. I anticipate improvement and resolution of her symptoms with this treatment and so we will have her follow-up my clinic as needed but she was encouraged to return or call at anytime with persistent or worsening symptoms and with any questions under concerns. Procedure: right shoulder subacromial space injection  Following an appropriate discussion with the patient regarding the risks and benefits of the procedure she consented to proceed. her right shoulder was prepped using chlorhexadine solution. Using aseptic technique and through a posterior approach, her right shoulder subacromial space was injected with a 4 cc mixture of 1cc 40mg/ml kenalog and 3 cc of 1% lidocaine without epinephrine. A band aid was applied to the injection site. she tolerated the injection with no immediate adverse reactions.               NA = Not assessed  RTC = Rotator cuff  RCT = Rotator cuff tear  ER = External rotation  IR = Internal rotation  AC = Acromioclavicular  GH = Glenohumeral  n = No  y = Yes

## 2020-09-02 ENCOUNTER — HOSPITAL ENCOUNTER (OUTPATIENT)
Dept: PHYSICAL THERAPY | Age: 50
Setting detail: THERAPIES SERIES
Discharge: HOME | End: 2020-09-02
Payer: COMMERCIAL

## 2020-09-02 PROCEDURE — 97140 MANUAL THERAPY 1/> REGIONS: CPT

## 2020-09-02 PROCEDURE — 97110 THERAPEUTIC EXERCISES: CPT

## 2020-09-02 ASSESSMENT — PAIN DESCRIPTION - PROGRESSION: CLINICAL_PROGRESSION: GRADUALLY IMPROVING

## 2020-09-02 ASSESSMENT — PAIN DESCRIPTION - FREQUENCY: FREQUENCY: INTERMITTENT

## 2020-09-02 ASSESSMENT — PAIN SCALES - GENERAL: PAINLEVEL_OUTOF10: 0

## 2020-09-02 ASSESSMENT — PAIN DESCRIPTION - ONSET: ONSET: SUDDEN

## 2020-09-03 ENCOUNTER — ANESTHESIA EVENT (OUTPATIENT)
Dept: OPERATING ROOM | Age: 50
End: 2020-09-03
Payer: COMMERCIAL

## 2020-09-04 ENCOUNTER — HOSPITAL ENCOUNTER (OUTPATIENT)
Dept: PHYSICAL THERAPY | Age: 50
Setting detail: THERAPIES SERIES
Discharge: HOME | End: 2020-09-04
Payer: COMMERCIAL

## 2020-09-04 PROCEDURE — 97110 THERAPEUTIC EXERCISES: CPT

## 2020-09-04 PROCEDURE — 97140 MANUAL THERAPY 1/> REGIONS: CPT

## 2020-09-04 ASSESSMENT — PAIN SCALES - GENERAL: PAINLEVEL_OUTOF10: 0

## 2020-09-04 NOTE — PROGRESS NOTES
Physical Therapy  Daily Treatment Note  Date: 2020  Patient Name: Celso Archibald  MRN: [id number]     :   1970    Subjective:   General  Chart Reviewed: Yes  Additional Pertinent Hx: Being tested for connective tissue disorder  Family / Caregiver Present: No  Referring Practitioner: Dr. Colby Crystal  PT Visit Information  Onset Date: 20  PT Insurance Information: [employer] - 20 visits  Total # of Visits Approved: 12  Total # of Visits to Date: 3  Subjective  Subjective: Pateint reports today feeling well overall. Noted intermittnet sharp shooting pains down arm since last treatment, but pain does not cont. Pain Screening  Patient Currently in Pain: No  Pain Assessment  Pain Assessment: 0-10  Pain Level: 0  Vital Signs  Patient Currently in Pain: No       Treatment Activities:     Exercises  Exercise 2: Scaption with thumb up/ down 10x@; Active shoulder flexion 10x ; posterior capsule stretch 15\"x3 - continue as HEP  Exercise 3: Ball on Wall for ROM 10x10\"  Exercise 4: Ball on Wall for Scap Stabilization 10x ea 2# ball  Exercise 5: Ext/Rows 10x Purple tubing  Exercise 6: ER/IR/HAB 10x Lime  Exercise 7: Prone I,Y,T, ext, scap retraction, scap depression 10x  Exercise 8: Supine Protraction 2# 10x  Exercise 9: ABC's 1x 2#  Exercise 11: Side-lying Flexion, Abduction, ER 10x     Assessment:   Conditions Requiring Skilled Therapeutic Intervention  Body structures, Functions, Activity limitations: Decreased functional mobility ; Decreased ADL status; Decreased ROM; Increased pain;Decreased strength  Assessment: Cont exercises per chart with focus on strengthening R shoudler/scapular stabilizers. Added side-lying exercises this date with focus on scapular setting to restore proper movement pattern. Patient noted occasional popping with exercises with no pain. Patient noted muscle fatigue post exericses.   Treatment Diagnosis: Pain M25.511 M25.521   Stiffness M25.611   Weakness M62.511  M62.541   Tingling/ numbness R20.2  Prognosis: Good  Decision Making: Medium Complexity  History: R elbow pain may affect progression of exercises to R shoulder; being tested for connective tissue disorder  Clinical Presentation: Evolving  Barriers to Learning: None  REQUIRES PT FOLLOW UP: Yes  Treatment Initiated : Additional strengthening exs. Goals:  Short term goals  Time Frame for Short term goals: 6 visits  Short term goal 1: Decrease pain to 1-2/10 to improve sleep and tolerance to daily activities  Short term goal 2: Improve R shoulder ROM by 10 degrees to improve dressing, performance of ADL's  Short term goal 3:  Independent with HEP/ nerve protection  Long term goals  Time Frame for Long term goals : 12 visits  Long term goal 1: Improve strength of RUE to 5/5 for safe lifting and carrying and improve confidence in using her RUE  Long term goal 2: Improve R / prehension strength to be able to grasp objects/ open jars without difficulty  Long term goal 3: Improve function in UEFS by 10 points from 48.75 = 39% disability  Patient Goals   Patient goals : \" No pain\"    Plan:    Plan  Times per week: 2x/wk for 12 visits ( patient would like to limit visits as she has only 20 vs/ year )  Specific instructions for Next Treatment: PROM/scapular stabilization ex, RC strengthening, avoid repetitive elbow flexion; ice/IFC to R shoulder PRN  Current Treatment Recommendations: Strengthening, Home Exercise Program, ROM, Safety Education & Training, Patient/Caregiver Education & Training, Manual Therapy - Joint Manipulation, Modalities, Positioning  Plan Comment: Cont per POC  Timed Code Treatment Minutes: 42 Minutes     Therapy Time   Individual Concurrent Group Co-treatment   Time In 8047         Time Out 1527         Minutes 42         Timed Code Treatment Minutes: 42 Minutes      Treatment Charges: Minutes Units   []  Ultrasound     []  Electrical-Stim     []  Iontophoresis     []  Traction     []  Massage       []  Eval     []  Gait [x]  Ther Exercise 32  2   [x]  Manual Therapy  10 1   []  Ther Activities       []  Aquatics     []  Vasopneumatic Device     []  Neuro Re-Ed       []  Other       Total Treatment Time:              Arnulfo Guaman PTA

## 2020-09-04 NOTE — PROGRESS NOTES
PAT interview complete. ETA 0800 on 9/14/20. Entrance C. NPO after MN. Pt aware of Covid testing and time.

## 2020-09-08 ENCOUNTER — TELEPHONE (OUTPATIENT)
Dept: SURGERY | Age: 50
End: 2020-09-08

## 2020-09-09 ENCOUNTER — HOSPITAL ENCOUNTER (OUTPATIENT)
Dept: PHYSICAL THERAPY | Age: 50
Setting detail: THERAPIES SERIES
Discharge: HOME | End: 2020-09-09
Payer: COMMERCIAL

## 2020-09-09 PROCEDURE — 97110 THERAPEUTIC EXERCISES: CPT

## 2020-09-09 PROCEDURE — 97140 MANUAL THERAPY 1/> REGIONS: CPT

## 2020-09-09 ASSESSMENT — PAIN SCALES - GENERAL: PAINLEVEL_OUTOF10: 0

## 2020-09-09 ASSESSMENT — PAIN DESCRIPTION - FREQUENCY: FREQUENCY: INTERMITTENT

## 2020-09-09 ASSESSMENT — PAIN DESCRIPTION - PROGRESSION: CLINICAL_PROGRESSION: GRADUALLY IMPROVING

## 2020-09-09 NOTE — PROGRESS NOTES
Physical Therapy  Daily Treatment Note  Date: 2020  Patient Name: Edda Cueva  MRN: [id number]     :   1970    Subjective:   General  Chart Reviewed: Yes  Additional Pertinent Hx: Being tested for connective tissue disorder  Family / Caregiver Present: No  Referring Practitioner: Dr. Aure Lynne  PT Visit Information  Onset Date: 20  PT Insurance Information: [employer] - 20 visits  Total # of Visits Approved: 12  Total # of Visits to Date: 4  Subjective  Subjective: Pateint reports today with minimal change since last visit. Noted only 1 instance of sharp shooting pain since last visit, but not as intense as before. Pain Screening  Patient Currently in Pain: No  Pain Assessment  Pain Assessment: 0-10  Pain Level: 0  Pain Frequency: Intermittent  Clinical Progression: Gradually improving  Vital Signs  Patient Currently in Pain: No       Treatment Activities:     Exercises  Exercise 3: Ball on Wall for ROM 10x10\"  Exercise 4: Ball on Wall for Scap Stabilization 10x ea 2# ball  Exercise 5: Ext/Rows 10x2 Purple tubing  Exercise 6: ER/IR/HAB 10x Lime  Exercise 7: Prone I,Y,T, ext, scap retraction, scap depression 10x  Exercise 8: Supine Protraction 2# 10x  Exercise 9: ABC's 2x 2#  Exercise 10: PROM R Shoulder x10min  Exercise 11: Side-lying Flexion, Abduction, ER 10x    Assessment:   Conditions Requiring Skilled Therapeutic Intervention  Body structures, Functions, Activity limitations: Decreased functional mobility ; Decreased ADL status; Decreased ROM; Increased pain;Decreased strength  Assessment: Cont exercises per chart with focus on strengthening R shoudler/scapular stabilizers. Added sets to rows/ext for additional strengthnening. Patient noted only muscle fatigue post exercises with no pain.   Treatment Diagnosis: Pain M25.511 M25.521   Stiffness M25.611   Weakness M62.511  M62.541   Tingling/ numbness R20.2  Prognosis: Good  Decision Making: Medium Complexity  Clinical Presentation: Evolving  Barriers to Learning: None  REQUIRES PT FOLLOW UP: Yes     Goals:  Short term goals  Time Frame for Short term goals: 6 visits  Short term goal 1: Decrease pain to 1-2/10 to improve sleep and tolerance to daily activities  Short term goal 2: Improve R shoulder ROM by 10 degrees to improve dressing, performance of ADL's  Short term goal 3:  Independent with HEP/ nerve protection  Long term goals  Time Frame for Long term goals : 12 visits  Long term goal 1: Improve strength of RUE to 5/5 for safe lifting and carrying and improve confidence in using her RUE  Long term goal 2: Improve R / prehension strength to be able to grasp objects/ open jars without difficulty  Long term goal 3: Improve function in UEFS by 10 points from 48.75 = 39% disability  Patient Goals   Patient goals : \" No pain\"    Plan:    Plan  Times per week: 2x/wk for 12 visits ( patient would like to limit visits as she has only 20 vs/ year )  Current Treatment Recommendations: Strengthening, Home Exercise Program, ROM, Safety Education & Training, Patient/Caregiver Education & Training, Manual Therapy - Joint Manipulation, Modalities, Positioning  Plan Comment: Cont per POC  Timed Code Treatment Minutes: 38 Minutes     Therapy Time   Individual Concurrent Group Co-treatment   Time In 1450         Time Out 1528         Minutes 38         Timed Code Treatment Minutes: 38 Minutes      Treatment Charges: Minutes Units   []  Ultrasound     []  Electrical-Stim     []  Iontophoresis     []  Traction     []  Massage       []  Eval     []  Gait     [x]  Ther Exercise 28  2   [x]  Manual Therapy 10  1   []  Ther Activities       []  Aquatics     []  Vasopneumatic Device     []  Neuro Re-Ed       []  Other       Total Treatment Time: 38 3           Deya Figueroa PTA

## 2020-09-11 ENCOUNTER — HOSPITAL ENCOUNTER (OUTPATIENT)
Dept: PHYSICAL THERAPY | Age: 50
Setting detail: THERAPIES SERIES
Discharge: HOME | End: 2020-09-11
Payer: COMMERCIAL

## 2020-09-11 PROCEDURE — 97110 THERAPEUTIC EXERCISES: CPT

## 2020-09-11 PROCEDURE — 97140 MANUAL THERAPY 1/> REGIONS: CPT

## 2020-09-11 ASSESSMENT — PAIN DESCRIPTION - PROGRESSION: CLINICAL_PROGRESSION: GRADUALLY IMPROVING

## 2020-09-11 ASSESSMENT — PAIN SCALES - GENERAL: PAINLEVEL_OUTOF10: 0

## 2020-09-11 NOTE — PROGRESS NOTES
Physical Therapy  Daily Treatment Note  Date: 2020  Patient Name: Alcides Wells  MRN: [id number]     :   1970    Subjective:   General  Chart Reviewed: Yes  Additional Pertinent Hx: Being tested for connective tissue disorder  Family / Caregiver Present: No  Referring Practitioner: Dr. Marco Mike  PT Visit Information  Onset Date: 20  PT Insurance Information: [employer] - 20 visits  Total # of Visits Approved: 12  Total # of Visits to Date: 5  Subjective  Subjective: Patient reports today feeling well overall. Pateint denied any shooting pains since last visist. Denies any pain upon arrival today. Pain Screening  Patient Currently in Pain: No  Pain Assessment  Pain Assessment: 0-10  Pain Level: 0  Patient's Stated Pain Goal: No pain  Clinical Progression: Gradually improving  Vital Signs  Patient Currently in Pain: No       Treatment Activities:     Exercises  Exercise 4: Ball on Wall for Scap Stabilization 10x ea 2# ball  Exercise 5: Ext/Rows 10x2 Purple tubing  Exercise 6: ER/IR/HAB 10x Blue  Exercise 7: Prone I,Y,T, ext, scap retraction, scap depression 10x 1#  Exercise 8: Supine Protraction 3#10x  Exercise 9: ABC's 2x 3#  Exercise 11: Side-lying Flexion, Abduction, ER 10x  Exercise 12: UBE L1 5 min  Exercise 13: Wall Walks with band 10x Orange  Exercise 14: Wall Surrenders 10x Orange    Assessment:   Conditions Requiring Skilled Therapeutic Intervention  Body structures, Functions, Activity limitations: Decreased functional mobility ; Decreased ADL status; Decreased ROM; Increased pain;Decreased strength  Assessment: Cont exercises per chart with focus on strengthening R shoudler/scapular stabilizers. Added wall walks and wall surrenders for additional scapular strengthening. Added weight to prone exercises and increase resistance of IR/ER to increase strengthening potential of exericse.  Patient cont with good tolerance to exericses overall, noting only muscle fatigue post.  Treatment Diagnosis: Pain M25.511 M25.521   Stiffness M25.611   Weakness M62.511  M62.541   Tingling/ numbness R20.2  Prognosis: Good  Clinical Presentation: Evolving  Barriers to Learning: None  REQUIRES PT FOLLOW UP: Yes  Treatment Initiated : Additional strengthening exs. Goals:  Short term goals  Time Frame for Short term goals: 6 visits  Short term goal 1: Decrease pain to 1-2/10 to improve sleep and tolerance to daily activities  Short term goal 2: Improve R shoulder ROM by 10 degrees to improve dressing, performance of ADL's  Short term goal 3:  Independent with HEP/ nerve protection  Long term goals  Time Frame for Long term goals : 12 visits  Long term goal 1: Improve strength of RUE to 5/5 for safe lifting and carrying and improve confidence in using her RUE  Long term goal 2: Improve R / prehension strength to be able to grasp objects/ open jars without difficulty  Long term goal 3: Improve function in UEFS by 10 points from 48.75 = 39% disability  Patient Goals   Patient goals : \" No pain\"    Plan:    Plan  Times per week: 2x/wk for 12 visits ( patient would like to limit visits as she has only 20 vs/ year )  Current Treatment Recommendations: Strengthening, Home Exercise Program, ROM, Safety Education & Training, Patient/Caregiver Education & Training, Manual Therapy - Joint Manipulation, Modalities, Positioning  Plan Comment: Cont per POC  Timed Code Treatment Minutes: 44 Minutes     Therapy Time   Individual Concurrent Group Co-treatment   Time In 2746         Time Out 5337         Minutes 44         Timed Code Treatment Minutes: 44 Minutes      Treatment Charges: Minutes Units   []  Ultrasound     []  Electrical-Stim     []  Iontophoresis     []  Traction     []  Massage       []  Eval     []  Gait     []  Ther Exercise  29 2   [x]  Manual Therapy 10  1   []  Ther Activities       []  Aquatics     []  Vasopneumatic Device     []  Neuro Re-Ed       []  Other       Total Treatment Time: 44 3           Blossom Thomas PTA

## 2020-09-14 ENCOUNTER — ANESTHESIA (OUTPATIENT)
Dept: OPERATING ROOM | Age: 50
End: 2020-09-14
Payer: COMMERCIAL

## 2020-09-16 ENCOUNTER — HOSPITAL ENCOUNTER (OUTPATIENT)
Dept: PHYSICAL THERAPY | Age: 50
Setting detail: THERAPIES SERIES
Discharge: HOME | End: 2020-09-16
Payer: COMMERCIAL

## 2020-09-16 PROCEDURE — 97110 THERAPEUTIC EXERCISES: CPT

## 2020-09-16 ASSESSMENT — PAIN DESCRIPTION - ORIENTATION: ORIENTATION: RIGHT

## 2020-09-16 ASSESSMENT — PAIN DESCRIPTION - PROGRESSION: CLINICAL_PROGRESSION: GRADUALLY IMPROVING

## 2020-09-16 ASSESSMENT — PAIN DESCRIPTION - LOCATION: LOCATION: SHOULDER

## 2020-09-16 ASSESSMENT — PAIN DESCRIPTION - PAIN TYPE: TYPE: ACUTE PAIN

## 2020-09-16 ASSESSMENT — PAIN SCALES - GENERAL: PAINLEVEL_OUTOF10: 0

## 2020-09-16 NOTE — PROGRESS NOTES
Physical Therapy  Discharge Note  Date: 2020  Patient Name: Obinna Otero  MRN: [id number]     :   1970    Subjective:   General  Referring Practitioner: Dr. Jennie Alston  PT Visit Information  Onset Date: 20  PT Insurance Information: [employer] - 20 visits  Total # of Visits Approved: 12  Total # of Visits to Date: 6  No Show: 0  Canceled Appointment: 0  Subjective  Subjective: Patient reports R shoulder is slightly stronger but noticed pain when reaching/ lifting out to the side which can go up to 3-4/10 . R elbow doing better as she now knows what position to avoid. Pain Screening  Patient Currently in Pain: Denies  Pain Assessment  Pain Assessment: 0-10  Pain Level: 0  Pain Type: Acute pain  Pain Location: Shoulder  Pain Orientation: Right  Clinical Progression: Gradually improving  Vital Signs  Patient Currently in Pain: Denies       Treatment Activities:    AROM RUE (degrees)  R Shoulder Flexion 0-180: 0 / 125 ( now 155 )  R Shoulder Extension 0-45: 0 / 55  R Shoulder ABduction 0-180: 0 / 120, slow movement (  )  R Shoulder Int Rotation  0-70: 0 / 55 in 90 ABD ( NOW  80 ), thumb on contralateral scapula vertebral border  R Shoulder Ext Rotation 0-90: 0 / 55 in 90 ABD ( NOW 90 ) , fingers on C7  Strength RUE  Comment: R scapular stabilizers 3+-4/5 ( NOW 4+-5/5 )  R Shoulder Flexion: 4+/5  R Shoulder ABduction: 4+/5  R Shoulder Internal Rotation: 4+/5  R Shoulder External Rotation: 4+/5  R Elbow Flexion: 5/5  R Elbow Extension: 5/5  R Wrist Flexion: 5/5  R Wrist Extension: 5/5    Exercises  Exercise 3: Ball on Wall for ROM 10x10\"  Exercise 4: Ball on Wall for Scap Stabilization 10x ea 2# ball  Exercise 5: Ext/Rows 10x2 Purple tubing  Exercise 7: Prone I,Y,T, ext, scap retraction, scap depression 10x 2#  Exercise 8: Supine Protraction 3#10x  Exercise 9: ABC's 2x 3# ( 3 SHAPES, 5x, CW/CCW today )  Exercise 12:  UBE L1.5 5 min   Hand Dominance  Hand Dominance: Left  Left Hand Strength -  affected limb 3   UEFS Score 90           Upper Extremity Functional Scale -   Instruction to patient  - We are interested in knowing whether you are having any difficulty at all with the activities listed below because of your upper limb problem for which you are currently seeking attention. Key:  0 = Extreme Difficulty or Unable to Perform Activity   1 = Quite a Bit of Difficulty   2 = Moderate difficulty   3 = A Little Bit of Difficulty   4 = No Difficulty    Goals:  Short term goals  Time Frame for Short term goals: 6 visits  Short term goal 1: Decrease pain to 1-2/10 to improve sleep and tolerance to daily activities - PARTLY MET  Short term goal 2: Improve R shoulder ROM by 10 degrees to improve dressing, performance of ADL's - MET  Short term goal 3: Independent with HEP/ nerve protection - MET  Long term goals  Time Frame for Long term goals : 12 visits  Long term goal 1: Improve strength of RUE to 5/5 for safe lifting and carrying and improve confidence in using her RUE - PARTLY MET  Long term goal 2: Improve R / prehension strength to be able to grasp objects/ open jars without difficulty - MET  Long term goal 3: Improve function in UEFS by 10 points from 48.75 = 39% disability - MET ( NOW 10% DISABILITY )  Patient Goals   Patient goals : \" No pain\"  Plan:    Plan  Times per week: 2x/wk for 12 visits ( patient would like to limit visits as she has only 20 vs/ year )  Current Treatment Recommendations: Strengthening, Home Exercise Program, ROM, Safety Education & Training, Patient/Caregiver Education & Training, Manual Therapy - Joint Manipulation, Modalities  Plan Comment: Requested to be discharged at this time and will continue with her HEP.   Timed Code Treatment Minutes: 38 Minutes     Therapy Time   Individual Concurrent Group Co-treatment   Time In 2835         Time Out 3297         Minutes 43         Timed Code Treatment Minutes: 38 Minutes     Treatment Charges: Minutes Units   []  Ultrasound []  Electrical-Stim     []  Iontophoresis     []  Traction     []  Massage       []  Eval     []  Gait     [x]  Ther Exercise 38  3    []  Manual Therapy       []  Ther Activities       []  Aquatics     []  Vasopneumatic Device     []  Neuro Re-Ed       []  Other       Total Treatment Time: 45 3        Ma  AINSLEY Nava Electronically signed by Farheen Barraza PT,CHT on 9/16/2020 at 3:44 PM

## 2020-09-18 ENCOUNTER — APPOINTMENT (OUTPATIENT)
Dept: PHYSICAL THERAPY | Age: 50
End: 2020-09-18
Payer: COMMERCIAL

## 2020-09-21 ENCOUNTER — APPOINTMENT (OUTPATIENT)
Dept: PHYSICAL THERAPY | Age: 50
End: 2020-09-21
Payer: COMMERCIAL

## 2020-09-23 ENCOUNTER — APPOINTMENT (OUTPATIENT)
Dept: PHYSICAL THERAPY | Age: 50
End: 2020-09-23
Payer: COMMERCIAL

## 2020-09-30 ENCOUNTER — APPOINTMENT (OUTPATIENT)
Dept: GENERAL RADIOLOGY | Age: 50
End: 2020-09-30
Payer: COMMERCIAL

## 2020-09-30 ENCOUNTER — HOSPITAL ENCOUNTER (EMERGENCY)
Age: 50
Discharge: HOME | End: 2020-09-30
Attending: EMERGENCY MEDICINE
Payer: COMMERCIAL

## 2020-09-30 VITALS
OXYGEN SATURATION: 98 % | HEIGHT: 67 IN | WEIGHT: 180 LBS | DIASTOLIC BLOOD PRESSURE: 68 MMHG | RESPIRATION RATE: 17 BRPM | BODY MASS INDEX: 28.25 KG/M2 | HEART RATE: 61 BPM | SYSTOLIC BLOOD PRESSURE: 122 MMHG | TEMPERATURE: 98.5 F

## 2020-09-30 LAB
ABSOLUTE EOS #: 0 K/UL (ref 0–0.4)
ABSOLUTE IMMATURE GRANULOCYTE: ABNORMAL K/UL (ref 0–0.3)
ABSOLUTE LYMPH #: 0.8 K/UL (ref 1–4.8)
ABSOLUTE MONO #: 0.3 K/UL (ref 0.1–1.3)
ALBUMIN SERPL-MCNC: 4.6 G/DL (ref 3.5–5.2)
ALBUMIN/GLOBULIN RATIO: ABNORMAL (ref 1–2.5)
ALP BLD-CCNC: 55 U/L (ref 35–104)
ALT SERPL-CCNC: 16 U/L (ref 5–33)
ANION GAP SERPL CALCULATED.3IONS-SCNC: 11 MMOL/L (ref 9–17)
AST SERPL-CCNC: 20 U/L
BASOPHILS # BLD: 0 % (ref 0–2)
BASOPHILS ABSOLUTE: 0 K/UL (ref 0–0.2)
BILIRUB SERPL-MCNC: 1.52 MG/DL (ref 0.3–1.2)
BUN BLDV-MCNC: 13 MG/DL (ref 6–20)
BUN/CREAT BLD: ABNORMAL (ref 9–20)
C-REACTIVE PROTEIN: 0.7 MG/L (ref 0–5)
CALCIUM SERPL-MCNC: 9.7 MG/DL (ref 8.6–10.4)
CHLORIDE BLD-SCNC: 99 MMOL/L (ref 98–107)
CO2: 24 MMOL/L (ref 20–31)
CREAT SERPL-MCNC: 0.57 MG/DL (ref 0.5–0.9)
DIFFERENTIAL TYPE: ABNORMAL
EOSINOPHILS RELATIVE PERCENT: 1 % (ref 0–4)
GFR AFRICAN AMERICAN: >60 ML/MIN
GFR NON-AFRICAN AMERICAN: >60 ML/MIN
GFR SERPL CREATININE-BSD FRML MDRD: ABNORMAL ML/MIN/{1.73_M2}
GFR SERPL CREATININE-BSD FRML MDRD: ABNORMAL ML/MIN/{1.73_M2}
GLUCOSE BLD-MCNC: 115 MG/DL (ref 70–99)
HCT VFR BLD CALC: 43.3 % (ref 36–46)
HEMOGLOBIN: 14.9 G/DL (ref 12–16)
IMMATURE GRANULOCYTES: ABNORMAL %
LACTATE DEHYDROGENASE: 123 U/L (ref 135–214)
LIPASE: 27 U/L (ref 13–60)
LYMPHOCYTES # BLD: 10 % (ref 24–44)
MAGNESIUM: 2.1 MG/DL (ref 1.6–2.6)
MCH RBC QN AUTO: 30.4 PG (ref 26–34)
MCHC RBC AUTO-ENTMCNC: 34.4 G/DL (ref 31–37)
MCV RBC AUTO: 88.4 FL (ref 80–100)
MONOCYTES # BLD: 4 % (ref 1–7)
NRBC AUTOMATED: ABNORMAL PER 100 WBC
PDW BLD-RTO: 12.9 % (ref 11.5–14.9)
PLATELET # BLD: 216 K/UL (ref 150–450)
PLATELET ESTIMATE: ABNORMAL
PMV BLD AUTO: 7.7 FL (ref 6–12)
POTASSIUM SERPL-SCNC: 4.2 MMOL/L (ref 3.7–5.3)
RBC # BLD: 4.9 M/UL (ref 4–5.2)
RBC # BLD: ABNORMAL 10*6/UL
SEG NEUTROPHILS: 85 % (ref 36–66)
SEGMENTED NEUTROPHILS ABSOLUTE COUNT: 7.4 K/UL (ref 1.3–9.1)
SODIUM BLD-SCNC: 134 MMOL/L (ref 135–144)
TOTAL PROTEIN: 7.7 G/DL (ref 6.4–8.3)
WBC # BLD: 8.5 K/UL (ref 3.5–11)
WBC # BLD: ABNORMAL 10*3/UL

## 2020-09-30 PROCEDURE — 71045 X-RAY EXAM CHEST 1 VIEW: CPT

## 2020-09-30 PROCEDURE — 93005 ELECTROCARDIOGRAM TRACING: CPT | Performed by: EMERGENCY MEDICINE

## 2020-09-30 PROCEDURE — 83735 ASSAY OF MAGNESIUM: CPT

## 2020-09-30 PROCEDURE — 6360000002 HC RX W HCPCS: Performed by: EMERGENCY MEDICINE

## 2020-09-30 PROCEDURE — 96375 TX/PRO/DX INJ NEW DRUG ADDON: CPT

## 2020-09-30 PROCEDURE — 96374 THER/PROPH/DIAG INJ IV PUSH: CPT

## 2020-09-30 PROCEDURE — 36415 COLL VENOUS BLD VENIPUNCTURE: CPT

## 2020-09-30 PROCEDURE — 86140 C-REACTIVE PROTEIN: CPT

## 2020-09-30 PROCEDURE — 99285 EMERGENCY DEPT VISIT HI MDM: CPT

## 2020-09-30 PROCEDURE — 99284 EMERGENCY DEPT VISIT MOD MDM: CPT

## 2020-09-30 PROCEDURE — 2580000003 HC RX 258: Performed by: EMERGENCY MEDICINE

## 2020-09-30 PROCEDURE — 83690 ASSAY OF LIPASE: CPT

## 2020-09-30 PROCEDURE — 80053 COMPREHEN METABOLIC PANEL: CPT

## 2020-09-30 PROCEDURE — 85025 COMPLETE CBC W/AUTO DIFF WBC: CPT

## 2020-09-30 PROCEDURE — 83615 LACTATE (LD) (LDH) ENZYME: CPT

## 2020-09-30 RX ORDER — HYDROXYCHLOROQUINE SULFATE 200 MG/1
TABLET, FILM COATED ORAL
COMMUNITY
End: 2020-09-30 | Stop reason: SINTOL

## 2020-09-30 RX ORDER — ONDANSETRON 4 MG/1
4 TABLET, ORALLY DISINTEGRATING ORAL EVERY 8 HOURS PRN
Qty: 20 TABLET | Refills: 0 | Status: SHIPPED | OUTPATIENT
Start: 2020-09-30 | End: 2020-10-01 | Stop reason: CLARIF

## 2020-09-30 RX ORDER — 0.9 % SODIUM CHLORIDE 0.9 %
1000 INTRAVENOUS SOLUTION INTRAVENOUS ONCE
Status: COMPLETED | OUTPATIENT
Start: 2020-09-30 | End: 2020-09-30

## 2020-09-30 RX ORDER — KETOROLAC TROMETHAMINE 30 MG/ML
30 INJECTION, SOLUTION INTRAMUSCULAR; INTRAVENOUS ONCE
Status: COMPLETED | OUTPATIENT
Start: 2020-09-30 | End: 2020-09-30

## 2020-09-30 RX ORDER — ONDANSETRON 2 MG/ML
4 INJECTION INTRAMUSCULAR; INTRAVENOUS ONCE
Status: COMPLETED | OUTPATIENT
Start: 2020-09-30 | End: 2020-09-30

## 2020-09-30 RX ADMIN — SODIUM CHLORIDE 1000 ML: 9 INJECTION, SOLUTION INTRAVENOUS at 10:27

## 2020-09-30 RX ADMIN — KETOROLAC TROMETHAMINE 30 MG: 30 INJECTION, SOLUTION INTRAMUSCULAR; INTRAVENOUS at 10:38

## 2020-09-30 RX ADMIN — ONDANSETRON 4 MG: 2 INJECTION INTRAMUSCULAR; INTRAVENOUS at 10:27

## 2020-09-30 ASSESSMENT — PAIN DESCRIPTION - FREQUENCY: FREQUENCY: CONTINUOUS

## 2020-09-30 ASSESSMENT — ENCOUNTER SYMPTOMS
ABDOMINAL PAIN: 0
BACK PAIN: 0
COUGH: 0
CONSTIPATION: 0
TROUBLE SWALLOWING: 0
VOMITING: 1
BLOOD IN STOOL: 0
SORE THROAT: 1
SHORTNESS OF BREATH: 0
DIARRHEA: 1
COLOR CHANGE: 0
NAUSEA: 1

## 2020-09-30 ASSESSMENT — PAIN SCALES - GENERAL
PAINLEVEL_OUTOF10: 3
PAINLEVEL_OUTOF10: 6

## 2020-09-30 ASSESSMENT — PAIN DESCRIPTION - LOCATION: LOCATION: HEAD

## 2020-09-30 ASSESSMENT — PAIN DESCRIPTION - PAIN TYPE: TYPE: ACUTE PAIN

## 2020-09-30 NOTE — ED PROVIDER NOTES
[address]  eMERGENCY dEPARTMENT eNCOUnter    Pt Name: Margaret Vidal  MRN: [id number]  YOB: 1970  Date of evaluation:9/30/20  PCP: Milton Winchester, [address] [place] [address]       Chief Complaint   Patient presents with    Emesis    Dehydration       HISTORY OF PRESENT ILLNESS    Margaret Vidal is a 47 y.o. female who presents with a chief complaint of nausea, vomiting and diarrhea. Patient states last night she started vomiting and having diarrhea. She had multiple episodes of each. It was nonbloody. States she was not able to drink anything all last night. She had the same food last night that her family at nobody else is having symptoms. Denies any abdominal pain right now. No chest pain, difficulty breathing or cough. She reports a mild sore throat but no other complaints. She was told to come to the emergency department to be tested for COVID. Has not been around anybody sick that she knows of. States she did feel feverish last night but she did not take her temperature. Symptoms are acute. Symptoms are moderate. Nothing make symptoms better or worse. Patient has no other complaints at this time. Of note she did state that her rheumatologist started her on hydroxychloroquine for her lupus about 3 weeks ago. REVIEW OF SYSTEMS       Review of Systems   Constitutional: Negative for chills, fatigue and fever. HENT: Positive for sore throat. Negative for congestion, ear pain and trouble swallowing. Eyes: Negative for visual disturbance. Respiratory: Negative for cough and shortness of breath. Cardiovascular: Negative for chest pain, palpitations and leg swelling. Gastrointestinal: Positive for diarrhea, nausea and vomiting. Negative for abdominal pain, blood in stool and constipation. Genitourinary: Negative for dysuria and flank pain. Musculoskeletal: Negative for arthralgias, back pain, myalgias and neck pain.    Skin: Negative for color change, rash and wound. Neurological: Negative for dizziness, weakness, light-headedness, numbness and headaches. Psychiatric/Behavioral: Negative for confusion. All other systems reviewed and are negative. Negativein 10 essential Systems except as mentioned above and in the HPI. PAST MEDICAL HISTORY     Past Medical History:   Diagnosis Date    Asthma 12/16/2014    Connective tissue disorder ([facility] [address]     Depression     Irritable bowel syndrome with diarrhea 6/29/2016         SURGICAL HISTORY      has a past surgical history that includes Hand surgery; Tubal ligation; Bunionectomy; Leg Tendon Surgery; and pre-malignant / benign skin lesion excision. CURRENT MEDICATIONS       Previous Medications    ALBUTEROL SULFATE HFA (PROVENTIL HFA) 108 (90 BASE) MCG/ACT INHALER    Inhale 2 puffs into the lungs every 4 hours as needed for Wheezing    CYCLOBENZAPRINE (FLEXERIL) 5 MG TABLET    1 - 2 po tid prn muscle spasm    IBUPROFEN (ADVIL;MOTRIN) 600 MG TABLET    Take 1 tablet by mouth every 6 hours as needed for Pain    MELOXICAM PO    Take by mouth    SUPREP BOWEL PREP KIT 17.5-3.13-1.6 GM/177ML SOLN    Take as directed - dispense one Kit       ALLERGIES     is allergic to pcn [penicillins]. FAMILY HISTORY     She indicated that her mother is alive. She indicated that her father is alive. family history is not on file. SOCIAL HISTORY      reports that she has never smoked. She has never used smokeless tobacco. She reports that she does not drink alcohol or use drugs. PHYSICAL EXAM     INITIAL VITALS:  height is 5' 7\" (1.702 m) and weight is 180 lb (81.6 kg). Her oral temperature is 98.5 °F (36.9 °C). Her blood pressure is 126/69 and her pulse is 74. Her respiration is 24 and oxygen saturation is 98%. Physical Exam  Vitals signs and nursing note reviewed. Constitutional:       General: She is not in acute distress. HENT:      Head: Normocephalic and atraumatic.    Eyes:      Conjunctiva/sclera: Conjunctivae normal.      Pupils: Pupils are equal, round, and reactive to light. Neck:      Musculoskeletal: Neck supple. Cardiovascular:      Rate and Rhythm: Normal rate and regular rhythm. Heart sounds: Normal heart sounds. No murmur. Pulmonary:      Effort: Pulmonary effort is normal. No respiratory distress. Breath sounds: Normal breath sounds. Abdominal:      General: Bowel sounds are normal. There is no distension. Palpations: Abdomen is soft. Tenderness: There is no abdominal tenderness. Musculoskeletal:         General: No tenderness. Lymphadenopathy:      Cervical: No cervical adenopathy. Skin:     General: Skin is warm and dry. Findings: No rash. Neurological:      Mental Status: She is alert and oriented to person, place, and time. Psychiatric:         Judgment: Judgment normal.           DIFFERENTIAL DIAGNOSIS/MDM:   59-year-old female presents with 1 day of nausea, vomiting and diarrhea. She is afebrile, nontoxic, normal vital signs. No acute distress. No abdominal tenderness on my exam.    Differential includes gastroenteritis, dehydration, metabolic abnormality, viral syndrome. We will give IV fluids, antiemetics, check blood work. I also going to check EKG since she is been on hydroxychloroquine. Told patient that we were not able to do outpatient testing for COVID-19 in the emergency department and advised her to go to [facility] walk-in flu clinic for this.     DIAGNOSTIC RESULTS     EKG: All EKG's are interpreted by the Emergency Department Physician who either signs or Co-signs this chart in the absence of a cardiologist.    EKG Interpretation    Interpreted by me    Rhythm: normal sinus   Rate: normal  Axis: normal  Ectopy: none  Conduction: normal  ST Segments: no acute change  T Waves: no acute change  Q Waves: none    Clinical Impression: Nonspecific EKG    RADIOLOGY:   I directly visualized the following  images and reviewed the radiologist interpretations:  XR CHEST PORTABLE   Final Result   No acute cardiopulmonary pathology. ED BEDSIDE ULTRASOUND:      LABS:  Labs Reviewed   CBC WITH AUTO DIFFERENTIAL - Abnormal; Notable for the following components:       Result Value    Seg Neutrophils 85 (*)     Lymphocytes 10 (*)     Absolute Lymph # 0.80 (*)     All other components within normal limits   COMPREHENSIVE METABOLIC PANEL - Abnormal; Notable for the following components:    Glucose 115 (*)     Sodium 134 (*)     Total Bilirubin 1.52 (*)     All other components within normal limits   LACTATE DEHYDROGENASE - Abnormal; Notable for the following components:     (*)     All other components within normal limits   LIPASE   MAGNESIUM   C-REACTIVE PROTEIN         EMERGENCY DEPARTMENT COURSE:   Vitals:    Vitals:    09/30/20 1007 09/30/20 1045 09/30/20 1100   BP: (!) 145/70 (!) 140/79 126/69   Pulse: 89 80 74   Resp: 18 19 24   Temp: 98.5 °F (36.9 °C)     TempSrc: Oral     SpO2: 98% 100% 98%   Weight: 180 lb (81.6 kg)     Height: 5' 7\" (1.702 m)       Patient's work-up is mostly unremarkable here. Has not had any emesis or diarrhea here. Chest x-ray is normal.  Electrolytes are normal.  Her CBC does show a lymphopenia however this could be secondary to her hydroxychloroquine use. She does not have any other lab work suggestive of COVID-19. LDH and CRP are normal.  Has no elevated LFTs and her x-ray is unremarkable. I told patient I have a lower suspicion for COVID-19 however cannot totally ruled out. I advised her to speak with her PCP and her rheumatologist about continuing the hydroxychloroquine but since this is the only new medication that she has started taking I think she should stop taking it until told otherwise by her physician or rheumatologist.  I did check her EKG and her QTC is normal.  Will discharge home with short course of Zofran. Advised return if any symptoms worsen. Advised to keep her self well-hydrated. She is agreeable with plan will be discharged home today. CRITICALCARE:      CONSULTS:  None      PROCEDURES:      FINAL IMPRESSION      1.  Nausea vomiting and diarrhea            DISPOSITION/PLAN   DISPOSITION Decision To Discharge 09/30/2020 11:51:44 AM      PATIENT REFERRED TO:  ANDREW Cunningham - CNP  [address] [facility]  [address] [facility],[address]  [place] [id number] [phone]    Schedule an appointment as soon as possible for a visit       [facility] ED  [place] [address] [phone]    As needed, If symptoms worsen      DISCHARGE MEDICATIONS:  New Prescriptions    ONDANSETRON (ZOFRAN ODT) 4 MG DISINTEGRATING TABLET    Take 1 tablet by mouth every 8 hours as needed for Nausea       (Please note that portions ofthis note were completed with a voice recognition program.  Efforts were made to edit the dictations but occasionally words are mis-transcribed.)    Ardiana Rivers DO  Attending Emergency Physician          Adriana Rivers DO  09/30/20 1153

## 2020-09-30 NOTE — ED NOTES
Report given to Michelle Morton RN from ED. Report method in person   The following was reviewed with receiving RN:   Current vital signs:  /69   Pulse 74   Temp 98.5 °F (36.9 °C) (Oral)   Resp 24   Ht 5' 7\" (1.702 m)   Wt 180 lb (81.6 kg)   LMP 09/02/2020   SpO2 98%   BMI 28.19 kg/m²                MEWS Score: 1     Any medication or safety alerts were reviewed. Any pending diagnostics and notifications were also reviewed, as well as any safety concerns or issues, abnormal labs, abnormal imaging, and abnormal assessment findings. Questions were answered.             Ingrid Suh RN  09/30/20 9434

## 2020-09-30 NOTE — ED NOTES
Mode of arrival walk in         Chief complaint(s): vomiting diarrhea and headache       Arrival Note (brief scenario, treatment PTA, etc). : Pt states  started with vomiting diarrhea and headache at 1230 am.  Pt states started on a new medication for connective tissue disease 2 weeks ago and unsure whether this is a side effect to the medication. Pt states was sent to the ED for PCR covid test.         C= \"Have you ever felt that you should Cut down on your drinking? \"  No  A= \"Have people Annoyed you by criticizing your drinking? \"  No  G= \"Have you ever felt bad or Guilty about your drinking? \"  No  E= \"Have you ever had a drink as an Eye-opener first thing in the morning to steady your nerves or to help a hangover? \"  No      Deferred []      Reason for deferring: N/A    *If yes to two or more: probable alcohol abuse. Jasper Alejandra RN  09/30/20 8768

## 2020-10-01 ENCOUNTER — HOSPITAL ENCOUNTER (OUTPATIENT)
Age: 50
Setting detail: SPECIMEN
Discharge: HOME | End: 2020-10-01
Payer: COMMERCIAL

## 2020-10-01 LAB
EKG ATRIAL RATE: 73 BPM
EKG P AXIS: 55 DEGREES
EKG P-R INTERVAL: 136 MS
EKG Q-T INTERVAL: 390 MS
EKG QRS DURATION: 88 MS
EKG QTC CALCULATION (BAZETT): 429 MS
EKG R AXIS: 78 DEGREES
EKG T AXIS: 65 DEGREES
EKG VENTRICULAR RATE: 73 BPM

## 2020-10-04 LAB — SARS-COV-2, NAA: NOT DETECTED

## 2020-10-26 ENCOUNTER — HOSPITAL ENCOUNTER (OUTPATIENT)
Dept: PREADMISSION TESTING | Age: 50
Setting detail: SPECIMEN
Discharge: HOME | End: 2020-10-30
Payer: COMMERCIAL

## 2020-10-26 PROCEDURE — U0003 INFECTIOUS AGENT DETECTION BY NUCLEIC ACID (DNA OR RNA); SEVERE ACUTE RESPIRATORY SYNDROME CORONAVIRUS 2 (SARS-COV-2) (CORONAVIRUS DISEASE [COVID-19]), AMPLIFIED PROBE TECHNIQUE, MAKING USE OF HIGH THROUGHPUT TECHNOLOGIES AS DESCRIBED BY CMS-2020-01-R: HCPCS

## 2020-10-27 LAB
SARS-COV-2, RAPID: NORMAL
SARS-COV-2: NORMAL
SARS-COV-2: NOT DETECTED
SOURCE: NORMAL

## 2020-10-27 RX ORDER — HYDROCORTISONE ACETATE 0.5 %
1 CREAM (GRAM) TOPICAL DAILY
COMMUNITY

## 2020-10-27 RX ORDER — M-VIT,TX,IRON,MINS/CALC/FOLIC 27MG-0.4MG
1 TABLET ORAL DAILY
COMMUNITY
End: 2022-09-13

## 2020-10-27 RX ORDER — CHLORAL HYDRATE 500 MG
1200 CAPSULE ORAL DAILY
COMMUNITY
End: 2020-12-01

## 2020-10-27 RX ORDER — ASCORBIC ACID 500 MG
600 TABLET ORAL DAILY
COMMUNITY
End: 2022-09-13

## 2020-10-27 NOTE — PROGRESS NOTES
PAT phone interview completed. Surgery date/time/location verified with pt. One visitor and masking policy reviewed with pt. NPO after MN and completing colon prep reviewed.

## 2020-10-30 ENCOUNTER — HOSPITAL ENCOUNTER (OUTPATIENT)
Age: 50
Setting detail: OUTPATIENT SURGERY
Discharge: HOME | End: 2020-10-30
Attending: SURGERY | Admitting: SURGERY
Payer: COMMERCIAL

## 2020-10-30 VITALS
BODY MASS INDEX: 28.19 KG/M2 | RESPIRATION RATE: 21 BRPM | HEART RATE: 68 BPM | SYSTOLIC BLOOD PRESSURE: 99 MMHG | TEMPERATURE: 97.5 F | WEIGHT: 179.6 LBS | DIASTOLIC BLOOD PRESSURE: 64 MMHG | OXYGEN SATURATION: 100 % | HEIGHT: 67 IN

## 2020-10-30 VITALS
SYSTOLIC BLOOD PRESSURE: 147 MMHG | OXYGEN SATURATION: 100 % | RESPIRATION RATE: 21 BRPM | DIASTOLIC BLOOD PRESSURE: 70 MMHG

## 2020-10-30 LAB — HCG, PREGNANCY URINE (POC): NEGATIVE

## 2020-10-30 PROCEDURE — 3700000000 HC ANESTHESIA ATTENDED CARE: Performed by: SURGERY

## 2020-10-30 PROCEDURE — 7100000010 HC PHASE II RECOVERY - FIRST 15 MIN: Performed by: SURGERY

## 2020-10-30 PROCEDURE — 2580000003 HC RX 258: Performed by: ANESTHESIOLOGY

## 2020-10-30 PROCEDURE — 7100000000 HC PACU RECOVERY - FIRST 15 MIN: Performed by: SURGERY

## 2020-10-30 PROCEDURE — 6360000002 HC RX W HCPCS: Performed by: NURSE ANESTHETIST, CERTIFIED REGISTERED

## 2020-10-30 PROCEDURE — 7100000011 HC PHASE II RECOVERY - ADDTL 15 MIN: Performed by: SURGERY

## 2020-10-30 PROCEDURE — 2709999900 HC NON-CHARGEABLE SUPPLY: Performed by: SURGERY

## 2020-10-30 PROCEDURE — 45378 DIAGNOSTIC COLONOSCOPY: CPT | Performed by: SURGERY

## 2020-10-30 PROCEDURE — 3609027000 HC COLONOSCOPY: Performed by: SURGERY

## 2020-10-30 PROCEDURE — 81025 URINE PREGNANCY TEST: CPT

## 2020-10-30 RX ORDER — OXYCODONE HYDROCHLORIDE AND ACETAMINOPHEN 5; 325 MG/1; MG/1
1 TABLET ORAL PRN
Status: DISCONTINUED | OUTPATIENT
Start: 2020-10-30 | End: 2020-10-30 | Stop reason: HOSPADM

## 2020-10-30 RX ORDER — LABETALOL 20 MG/4 ML (5 MG/ML) INTRAVENOUS SYRINGE
5 EVERY 10 MIN PRN
Status: DISCONTINUED | OUTPATIENT
Start: 2020-10-30 | End: 2020-10-30 | Stop reason: HOSPADM

## 2020-10-30 RX ORDER — SODIUM CHLORIDE 0.9 % (FLUSH) 0.9 %
10 SYRINGE (ML) INJECTION PRN
Status: DISCONTINUED | OUTPATIENT
Start: 2020-10-30 | End: 2020-10-30 | Stop reason: HOSPADM

## 2020-10-30 RX ORDER — MEPERIDINE HYDROCHLORIDE 50 MG/ML
12.5 INJECTION INTRAMUSCULAR; INTRAVENOUS; SUBCUTANEOUS EVERY 5 MIN PRN
Status: DISCONTINUED | OUTPATIENT
Start: 2020-10-30 | End: 2020-10-30 | Stop reason: HOSPADM

## 2020-10-30 RX ORDER — ONDANSETRON 2 MG/ML
4 INJECTION INTRAMUSCULAR; INTRAVENOUS
Status: DISCONTINUED | OUTPATIENT
Start: 2020-10-30 | End: 2020-10-30 | Stop reason: HOSPADM

## 2020-10-30 RX ORDER — SODIUM CHLORIDE 9 MG/ML
INJECTION, SOLUTION INTRAVENOUS CONTINUOUS
Status: DISCONTINUED | OUTPATIENT
Start: 2020-10-30 | End: 2020-10-30 | Stop reason: HOSPADM

## 2020-10-30 RX ORDER — 0.9 % SODIUM CHLORIDE 0.9 %
500 INTRAVENOUS SOLUTION INTRAVENOUS
Status: DISCONTINUED | OUTPATIENT
Start: 2020-10-30 | End: 2020-10-30 | Stop reason: HOSPADM

## 2020-10-30 RX ORDER — PROMETHAZINE HYDROCHLORIDE 25 MG/ML
12.5 INJECTION, SOLUTION INTRAMUSCULAR; INTRAVENOUS
Status: DISCONTINUED | OUTPATIENT
Start: 2020-10-30 | End: 2020-10-30 | Stop reason: HOSPADM

## 2020-10-30 RX ORDER — SODIUM CHLORIDE 0.9 % (FLUSH) 0.9 %
10 SYRINGE (ML) INJECTION EVERY 12 HOURS SCHEDULED
Status: DISCONTINUED | OUTPATIENT
Start: 2020-10-30 | End: 2020-10-30 | Stop reason: HOSPADM

## 2020-10-30 RX ORDER — PROPOFOL 10 MG/ML
INJECTION, EMULSION INTRAVENOUS PRN
Status: DISCONTINUED | OUTPATIENT
Start: 2020-10-30 | End: 2020-10-30 | Stop reason: SDUPTHER

## 2020-10-30 RX ORDER — DIPHENHYDRAMINE HYDROCHLORIDE 50 MG/ML
12.5 INJECTION INTRAMUSCULAR; INTRAVENOUS
Status: DISCONTINUED | OUTPATIENT
Start: 2020-10-30 | End: 2020-10-30 | Stop reason: HOSPADM

## 2020-10-30 RX ORDER — SODIUM CHLORIDE, SODIUM LACTATE, POTASSIUM CHLORIDE, CALCIUM CHLORIDE 600; 310; 30; 20 MG/100ML; MG/100ML; MG/100ML; MG/100ML
INJECTION, SOLUTION INTRAVENOUS CONTINUOUS
Status: DISCONTINUED | OUTPATIENT
Start: 2020-10-30 | End: 2020-10-30 | Stop reason: HOSPADM

## 2020-10-30 RX ORDER — OXYCODONE HYDROCHLORIDE AND ACETAMINOPHEN 5; 325 MG/1; MG/1
2 TABLET ORAL PRN
Status: DISCONTINUED | OUTPATIENT
Start: 2020-10-30 | End: 2020-10-30 | Stop reason: HOSPADM

## 2020-10-30 RX ORDER — MORPHINE SULFATE 2 MG/ML
2 INJECTION, SOLUTION INTRAMUSCULAR; INTRAVENOUS EVERY 5 MIN PRN
Status: DISCONTINUED | OUTPATIENT
Start: 2020-10-30 | End: 2020-10-30 | Stop reason: HOSPADM

## 2020-10-30 RX ADMIN — PROPOFOL 50 MG: 10 INJECTION, EMULSION INTRAVENOUS at 08:31

## 2020-10-30 RX ADMIN — PROPOFOL 50 MG: 10 INJECTION, EMULSION INTRAVENOUS at 08:32

## 2020-10-30 RX ADMIN — PROPOFOL 50 MG: 10 INJECTION, EMULSION INTRAVENOUS at 08:36

## 2020-10-30 RX ADMIN — SODIUM CHLORIDE, POTASSIUM CHLORIDE, SODIUM LACTATE AND CALCIUM CHLORIDE: 600; 310; 30; 20 INJECTION, SOLUTION INTRAVENOUS at 08:27

## 2020-10-30 RX ADMIN — PROPOFOL 50 MG: 10 INJECTION, EMULSION INTRAVENOUS at 08:39

## 2020-10-30 ASSESSMENT — PULMONARY FUNCTION TESTS
PIF_VALUE: 1
PIF_VALUE: 0
PIF_VALUE: 0
PIF_VALUE: 1
PIF_VALUE: 0
PIF_VALUE: 1
PIF_VALUE: 0
PIF_VALUE: 0
PIF_VALUE: 1
PIF_VALUE: 0

## 2020-10-30 ASSESSMENT — PAIN - FUNCTIONAL ASSESSMENT: PAIN_FUNCTIONAL_ASSESSMENT: 0-10

## 2020-10-30 ASSESSMENT — PAIN SCALES - GENERAL: PAINLEVEL_OUTOF10: 0

## 2020-10-30 NOTE — ANESTHESIA PRE PROCEDURE
Department of Anesthesiology  Preprocedure Note       Name:  Gonzalo Omer   Age:  47 y.o.  :  1970                                          MRN:  [id number]         Date:  10/30/2020      Surgeon: Valeria Kwok):  Oleksandr Spears MD    Procedure: Procedure(s):  COLORECTAL CANCER SCREENING, NOT HIGH RISK    Medications prior to admission:   Prior to Admission medications    Medication Sig Start Date End Date Taking? Authorizing Provider   Multiple Vitamins-Minerals (THERAPEUTIC MULTIVITAMIN-MINERALS) tablet Take 1 tablet by mouth daily   Yes Historical Provider, MD   vitamin C (ASCORBIC ACID) 500 MG tablet Take 600 mg by mouth daily    Yes Historical Provider, MD   ZINC GLUCONATE PO Take 60 mg by mouth daily    Yes Historical Provider, MD   Omega-3 Fatty Acids (FISH OIL) 1000 MG CAPS Take 1,200 mg by mouth daily   Yes Historical Provider, MD   Glucosamine-Chondroit-Vit C-Mn (GLUCOSAMINE 1500 COMPLEX) CAPS Take 1 capsule by mouth daily   Yes Historical Provider, MD   albuterol sulfate HFA (PROVENTIL HFA) 108 (90 Base) MCG/ACT inhaler Inhale 2 puffs into the lungs every 4 hours as needed for Wheezing 18   Sherrell Mckeon MD       Current medications:    Current Facility-Administered Medications   Medication Dose Route Frequency Provider Last Rate Last Dose    0.9 % sodium chloride infusion   Intravenous Continuous Bill Graff MD        lactated ringers infusion   Intravenous Continuous Bill Graff MD        sodium chloride flush 0.9 % injection 10 mL  10 mL Intravenous 2 times per day Bill Graff MD        sodium chloride flush 0.9 % injection 10 mL  10 mL Intravenous PRN Bill Graff MD           Allergies: Allergies   Allergen Reactions    Adhesive Tape     Pcn [Penicillins]        Problem List:    Patient Active Problem List   Diagnosis Code    Depression F32.9    Irritable bowel syndrome with diarrhea K58.0    Overweight (BMI 25.0-29. 9) E66.3    Mild persistent asthma without complication Y86.78 Past Medical History:        Diagnosis Date    Asthma 12/16/2014    Connective tissue disorder ([facility] [address]     Depression     Irritable bowel syndrome with diarrhea 6/29/2016    PONV (postoperative nausea and vomiting)        Past Surgical History:        Procedure Laterality Date    BUNIONECTOMY      COLONOSCOPY      HAND SURGERY      with pin    LEG TENDON SURGERY      PRE-MALIGNANT / BENIGN SKIN LESION EXCISION      Face    TUBAL LIGATION         Social History:    Social History     Tobacco Use    Smoking status: Never Smoker    Smokeless tobacco: Never Used   Substance Use Topics    Alcohol use: Yes     Comment: once a week                                Counseling given: Not Answered      Vital Signs (Current):   Vitals:    09/04/20 1338 10/30/20 0717   BP:  123/70   Pulse:  74   Resp:  16   Temp:  97.4 °F (36.3 °C)   TempSrc:  Temporal   SpO2:  100%   Weight: 180 lb (81.6 kg) 179 lb 9.6 oz (81.5 kg)   Height: 5' 7\" (1.702 m) 5' 7\" (1.702 m)                                              BP Readings from Last 3 Encounters:   10/30/20 123/70   09/30/20 122/68   08/11/20 107/71       NPO Status: Time of last liquid consumption: 2300                        Time of last solid consumption: 0700                        Date of last liquid consumption: 10/29/20                        Date of last solid food consumption: 10/29/20    BMI:   Wt Readings from Last 3 Encounters:   10/30/20 179 lb 9.6 oz (81.5 kg)   09/30/20 180 lb (81.6 kg)   08/24/20 180 lb (81.6 kg)     Body mass index is 28.13 kg/m².     CBC:   Lab Results   Component Value Date    WBC 8.5 09/30/2020    RBC 4.90 09/30/2020    RBC 4.40 04/06/2012    HGB 14.9 09/30/2020    HCT 43.3 09/30/2020    MCV 88.4 09/30/2020    RDW 12.9 09/30/2020     09/30/2020     04/06/2012       CMP:   Lab Results   Component Value Date     09/30/2020    K 4.2 09/30/2020    CL 99 09/30/2020    CO2 24 09/30/2020    BUN 13 09/30/2020    CREATININE Yonatan Rodrigues MD   10/30/2020

## 2020-10-30 NOTE — OP NOTE
Operative Note      Patient: Teo Sams  YOB: 1970  MRN: [id number]    Date of Procedure: 10/30/2020      Pre-Op Diagnosis: screening for colon cancer    Post-Op Diagnosis: normal exam     Procedure: Colonoscopy     Anesthesia: MAC    EBL: none    Speciman: none    Clinical Indications: Patient presents for screening colonoscopy. Risks, benefits, options and potential complications of the procedure were discussed in detail and they agree to proceed and consent signed. Details of Procedure: The patient was brought to the colonoscopy suite and placed in the left lateral decubitus position. Under MAC the fiberoptic colonoscope was introduced into the rectum. This was subsequently advanced through the colon to the cecum. The cecal landmarks were identified. The bowel prep was good. Gradual withdrawal of the colonoscope was then undertaken. No vascular, polypoid or mucosal lesions were noted throughout the entire length of the colon. The anorectal canal was unremarkable. The colon was decompressed and the procedure was ended. The patient tolerated the procedure well and was transferred to the recovery area in stable condition.     Electronically signed by Immanuel Holguin MD on 10/30/2020 at 9:16 AM

## 2020-10-30 NOTE — ANESTHESIA POSTPROCEDURE EVALUATION
Department of Anesthesiology  Postprocedure Note    Patient: Gonzalo Omer  MRN: [id number]  [place]: 1970  Date of evaluation: 10/30/2020  Time:  8:45 AM     Procedure Summary     Date:  10/30/20 Room / Location:  [facility] [address] [facility]    Anesthesia Start:  0827 Anesthesia Stop:  4033    Procedure:  COLORECTAL CANCER SCREENING, NOT HIGH RISK (N/A ) Diagnosis:  (SCREEN)    Surgeon: Oleksandr Spears MD Responsible Provider:  ANDREW Elizabeth CRNA    Anesthesia Type:  MAC ASA Status:  2          Anesthesia Type: MAC    Kaylynn Phase I: Kaylynn Score: 5    Kaylynn Phase II:      Last vitals: Reviewed and per EMR flowsheets.        Anesthesia Post Evaluation    Patient location during evaluation: PACU  Patient participation: complete - patient participated  Level of consciousness: awake and alert  Airway patency: patent  Nausea & Vomiting: no nausea and no vomiting  Complications: no  Cardiovascular status: hemodynamically stable  Respiratory status: nasal cannula and spontaneous ventilation  Hydration status: euvolemic

## 2020-10-30 NOTE — H&P
Dashawn De Los Santos [address] [place], [address]           Matti Addison   47 y.o.  1970  [id number]      PCP: ANDREW Lainez - CNP  Referring Provider: Danielle Wright*   Author:   Dr. Pamella Blevins MD      Reason for Visit:       Chief Complaint   Patient presents with    New Patient       colon cancer screening         HPI:  Matti Addison presents in evaluation for Screening Colonoscopy. Denies any new colon-related problems. She does report a longstanding history of IBS with limited diarrhea each morning. Prior colonoscopy was 10+ years ago.     Review of Systems   Constitutional: Negative. Respiratory: Negative. Cardiovascular: Negative. Gastrointestinal: Positive for diarrhea. Musculoskeletal: Negative. Neurological: Negative. Allergies: Allergies   Allergen Reactions    Pcn [Penicillins]           Current Medications:  Current Facility-Administered Medications          Current Outpatient Medications   Medication Sig Dispense Refill    SUPREP BOWEL PREP KIT 17.5-3.13-1.6 GM/177ML SOLN Take as directed - dispense one Kit 1 kit 0    albuterol sulfate HFA (PROVENTIL HFA) 108 (90 Base) MCG/ACT inhaler Inhale 2 puffs into the lungs every 4 hours as needed for Wheezing 1 Inhaler 1    cyclobenzaprine (FLEXERIL) 5 MG tablet 1 - 2 po tid prn muscle spasm (Patient not taking: Reported on 7/20/2020) 30 tablet 0    ibuprofen (ADVIL;MOTRIN) 600 MG tablet Take 1 tablet by mouth every 6 hours as needed for Pain (Patient not taking: Reported on 8/11/2020) 30 tablet 0      No current facility-administered medications for this visit.            Problem List:      Patient Active Problem List   Diagnosis    Depression    Irritable bowel syndrome with diarrhea    Overweight (BMI 25.0-29. 9)    Mild persistent asthma without complication         Histories:  Past Medical History Past Medical History:   Diagnosis Date    Asthma 12/16/2014    Depression      Irritable bowel syndrome with diarrhea 6/29/2016         Family History   No family history on file. Past Surgical History   No past surgical history on file.       Social History   Social History            Socioeconomic History    Marital status:        Spouse name: None    Number of children: None    Years of education: None    Highest education level: None   Occupational History    None   Social Needs    Financial resource strain: Not hard at all   [place] insecurity       Worry: Never true       Inability: Never true    Transportation needs       Medical: No       Non-medical: No   Tobacco Use    Smoking status: Never Smoker    Smokeless tobacco: Never Used   Substance and Sexual Activity    Alcohol use: No    Drug use: No    Sexual activity: None   Lifestyle    Physical activity       Days per week: None       Minutes per session: None    Stress: None   Relationships    Social connections       Talks on phone: None       Gets together: None       Attends Rastafari service: None       Active member of club or organization: None       Attends meetings of clubs or organizations: None       Relationship status: None    Intimate partner violence       Fear of current or ex partner: None       Emotionally abused: None       Physically abused: None       Forced sexual activity: None   Other Topics Concern    None   Social History Narrative    None            Physical Examination:  /71 (Site: Left Upper Arm, Position: Sitting, Cuff Size: Medium Adult)   Ht 5' 7\" (1.702 m)   Wt 180 lb (81.6 kg)   BMI 28.19 kg/m²      Neck: Supple  Respiratory:  Lungs are clear to auscultation  Cardiovascular:  Normal Rate and Rhythm  Gastrointestinal:    Abdomen:  Soft, non-tender, no masses   Rectum/Anus:  Deferred to procedure  Integumentary:  Warm and dry  Neurologic:  Alert      Impression and Plan:    Diagnoses:   Diagnosis Orders   1. Encounter for screening colonoscopy         Plan:  Colonoscopy. The risks, benefits, options and potential complications of the procedure were discussed in detail with the patient and they agreed to proceed and consent was signed. Preoperative bowel prep instructions given to the patient.

## 2020-10-30 NOTE — BRIEF OP NOTE
Brief Postoperative Note      Patient: Roberta Lanza  YOB: 1970  MRN: [id number]    Date of Procedure: 10/30/2020    Pre-Op Diagnosis: SCREEN    Post-Op Diagnosis: normal exam       Procedure(s):  COLORECTAL CANCER SCREENING, NOT HIGH RISK    Surgeon(s):  Barbra Valdez MD    Assistant:  * No surgical staff found *    Anesthesia: Monitor Anesthesia Care    Estimated Blood Loss (mL): none    Complications: None    Specimens:   * No specimens in log *    Implants:  * No implants in log *      Drains: * No LDAs found *    Findings:     Electronically signed by Barbra Valdez MD on 10/30/2020 at 8:46 AM

## 2020-12-01 ENCOUNTER — HOSPITAL ENCOUNTER (OUTPATIENT)
Age: 50
Setting detail: SPECIMEN
Discharge: HOME | End: 2020-12-01
Payer: COMMERCIAL

## 2020-12-07 LAB
HPV SOURCE: NORMAL
HPV, GENOTYPE 16: NOT DETECTED
HPV, GENOTYPE 18: NOT DETECTED
HPV, HIGH RISK OTHER: NOT DETECTED

## 2020-12-09 LAB — CYTOLOGY REPORT: NORMAL

## 2020-12-31 ENCOUNTER — HOSPITAL ENCOUNTER (OUTPATIENT)
Age: 50
Setting detail: SPECIMEN
Discharge: HOME | End: 2020-12-31
Payer: COMMERCIAL

## 2020-12-31 DIAGNOSIS — N93.8 DUB (DYSFUNCTIONAL UTERINE BLEEDING): ICD-10-CM

## 2020-12-31 LAB
ABSOLUTE EOS #: 0.11 K/UL (ref 0–0.44)
ABSOLUTE IMMATURE GRANULOCYTE: <0.03 K/UL (ref 0–0.3)
ABSOLUTE LYMPH #: 1.96 K/UL (ref 1.1–3.7)
ABSOLUTE MONO #: 0.48 K/UL (ref 0.1–1.2)
BASOPHILS # BLD: 1 % (ref 0–2)
BASOPHILS ABSOLUTE: 0.03 K/UL (ref 0–0.2)
DIFFERENTIAL TYPE: NORMAL
EOSINOPHILS RELATIVE PERCENT: 2 % (ref 1–4)
HCT VFR BLD CALC: 43.4 % (ref 36.3–47.1)
HEMOGLOBIN: 13.9 G/DL (ref 11.9–15.1)
IMMATURE GRANULOCYTES: 0 %
IRON: 103 UG/DL (ref 37–145)
LYMPHOCYTES # BLD: 35 % (ref 24–43)
MCH RBC QN AUTO: 28.9 PG (ref 25.2–33.5)
MCHC RBC AUTO-ENTMCNC: 32 G/DL (ref 28.4–34.8)
MCV RBC AUTO: 90.2 FL (ref 82.6–102.9)
MONOCYTES # BLD: 9 % (ref 3–12)
NRBC AUTOMATED: 0 PER 100 WBC
PDW BLD-RTO: 12 % (ref 11.8–14.4)
PLATELET # BLD: 236 K/UL (ref 138–453)
PLATELET ESTIMATE: NORMAL
PMV BLD AUTO: 10.5 FL (ref 8.1–13.5)
RBC # BLD: 4.81 M/UL (ref 3.95–5.11)
RBC # BLD: NORMAL 10*6/UL
SEG NEUTROPHILS: 53 % (ref 36–65)
SEGMENTED NEUTROPHILS ABSOLUTE COUNT: 3.04 K/UL (ref 1.5–8.1)
THYROXINE, FREE: 1.38 NG/DL (ref 0.93–1.7)
TSH SERPL DL<=0.05 MIU/L-ACNC: 1.4 MIU/L (ref 0.3–5)
WBC # BLD: 5.6 K/UL (ref 3.5–11.3)
WBC # BLD: NORMAL 10*3/UL

## 2021-01-04 ENCOUNTER — HOSPITAL ENCOUNTER (OUTPATIENT)
Dept: ULTRASOUND IMAGING | Age: 51
Discharge: HOME | End: 2021-01-06
Payer: COMMERCIAL

## 2021-01-04 DIAGNOSIS — N93.8 DUB (DYSFUNCTIONAL UTERINE BLEEDING): ICD-10-CM

## 2021-01-04 PROCEDURE — 76830 TRANSVAGINAL US NON-OB: CPT

## 2021-08-23 ENCOUNTER — OFFICE VISIT (OUTPATIENT)
Dept: FAMILY MEDICINE CLINIC | Age: 51
End: 2021-08-23
Payer: COMMERCIAL

## 2021-08-23 ENCOUNTER — HOSPITAL ENCOUNTER (OUTPATIENT)
Age: 51
Setting detail: SPECIMEN
Discharge: HOME | End: 2021-08-23
Payer: COMMERCIAL

## 2021-08-23 VITALS
SYSTOLIC BLOOD PRESSURE: 112 MMHG | TEMPERATURE: 97.3 F | DIASTOLIC BLOOD PRESSURE: 73 MMHG | WEIGHT: 191 LBS | BODY MASS INDEX: 29.98 KG/M2 | HEIGHT: 67 IN | HEART RATE: 73 BPM | OXYGEN SATURATION: 99 %

## 2021-08-23 DIAGNOSIS — R10.84 GENERALIZED ABDOMINAL PAIN: ICD-10-CM

## 2021-08-23 DIAGNOSIS — J02.9 SORE THROAT: ICD-10-CM

## 2021-08-23 DIAGNOSIS — Z20.822 SUSPECTED COVID-19 VIRUS INFECTION: ICD-10-CM

## 2021-08-23 DIAGNOSIS — R05.9 COUGH: ICD-10-CM

## 2021-08-23 DIAGNOSIS — J02.9 SORE THROAT: Primary | ICD-10-CM

## 2021-08-23 LAB — S PYO AG THROAT QL: NORMAL

## 2021-08-23 PROCEDURE — 87880 STREP A ASSAY W/OPTIC: CPT | Performed by: NURSE PRACTITIONER

## 2021-08-23 PROCEDURE — 99214 OFFICE O/P EST MOD 30 MIN: CPT | Performed by: NURSE PRACTITIONER

## 2021-08-23 RX ORDER — CELECOXIB 200 MG/1
CAPSULE ORAL
COMMUNITY
Start: 2021-08-02 | End: 2022-09-13

## 2021-08-23 ASSESSMENT — ENCOUNTER SYMPTOMS
EYE ITCHING: 0
EYE DISCHARGE: 0
VOMITING: 0
ABDOMINAL PAIN: 0
SHORTNESS OF BREATH: 0
DIARRHEA: 0
NAUSEA: 0
ALLERGIC/IMMUNOLOGIC NEGATIVE: 1
EYES NEGATIVE: 1
SORE THROAT: 0
CHEST TIGHTNESS: 0
COUGH: 1

## 2021-08-23 NOTE — PATIENT INSTRUCTIONS

## 2021-08-23 NOTE — PROGRESS NOTES
[place] [address] [facility]   [address] [facility] [address] [address]  Dept: [phone]  Dept Fax: [phone]    Kane Harding is a 45 y.o. female who presents today forher medical conditions/complaints as noted below. Kane Harding is c/o of   Chief Complaint   Patient presents with    Cough     onset for 8 days     Pharyngitis    Abdominal Pain     HPI:     Cough  This is a new problem. The current episode started today. The problem has been gradually worsening. The problem occurs every few minutes. The cough is non-productive. Pertinent negatives include no chest pain, chills, fever, headaches, postnasal drip, rash, sore throat or shortness of breath. . Needs Covid test to return to work. Has had known exposure to RSV. Requesting resp panel. Has known exposure to Covid. Not taking meds for symptoms at this time. reports symptoms are improving. Past Medical History:   Diagnosis Date    Asthma 12/16/2014    Connective tissue disorder ([facility] [address] [facility]     Depression     Irritable bowel syndrome with diarrhea 6/29/2016    PONV (postoperative nausea and vomiting)       Past Surgical History:   Procedure Laterality Date    BUNIONECTOMY      COLONOSCOPY  10/30/2020    COLORECTAL CANCER SCREENING, NOT HIGH RISK (N/A )    COLONOSCOPY N/A 10/30/2020    COLORECTAL CANCER SCREENING, NOT HIGH RISK performed by Micheal Diego MD at [address] [place] [address]      with pin    LEG TENDON SURGERY      PRE-MALIGNANT / BENIGN SKIN LESION EXCISION      Face    TUBAL LIGATION         History reviewed. No pertinent family history.     Social History     Tobacco Use    Smoking status: Never Smoker    Smokeless tobacco: Never Used   Substance Use Topics    Alcohol use: Yes     Comment: once a week      Current Outpatient Medications   Medication Sig Dispense Refill    celecoxib (CELEBREX) 200 MG capsule TAKE 1 CAPSULE BY MOUTH 1 TO 2 TIMES DAILY WITH FOOD      albuterol sulfate HFA (PROVENTIL HFA) 108 (90 Base) MCG/ACT inhaler Inhale 2 puffs into the lungs every 4 hours as needed for Wheezing 1 Inhaler 1    Multiple Vitamins-Minerals (THERAPEUTIC MULTIVITAMIN-MINERALS) tablet Take 1 tablet by mouth daily (Patient not taking: Reported on 8/23/2021)      vitamin C (ASCORBIC ACID) 500 MG tablet Take 600 mg by mouth daily  (Patient not taking: Reported on 8/23/2021)      ZINC GLUCONATE PO Take 60 mg by mouth daily  (Patient not taking: Reported on 8/23/2021)      Glucosamine-Chondroit-Vit C-Mn (GLUCOSAMINE 1500 COMPLEX) CAPS Take 1 capsule by mouth daily (Patient not taking: Reported on 8/23/2021)       No current facility-administered medications for this visit. Allergies   Allergen Reactions    Adhesive Tape     Pcn [Penicillins]      Subjective:      Review of Systems   Constitutional: Negative for appetite change, chills, fatigue and fever. HENT: Negative for congestion, postnasal drip and sore throat. Eyes: Negative. Negative for discharge and itching. Respiratory: Positive for cough. Negative for chest tightness and shortness of breath. Cardiovascular: Negative for chest pain, palpitations and leg swelling. Gastrointestinal: Negative for abdominal pain, diarrhea, nausea and vomiting. Endocrine: Negative. Genitourinary: Negative for dysuria, frequency and urgency. Musculoskeletal: Negative for neck pain and neck stiffness. Skin: Negative for rash. Allergic/Immunologic: Negative. Neurological: Negative for dizziness, weakness, light-headedness and headaches. Hematological: Negative for adenopathy. Psychiatric/Behavioral: Negative for confusion. Objective:      Physical Exam  Vitals reviewed. Constitutional:       Appearance: She is well-developed. HENT:      Head: Normocephalic.       Right Ear: External ear normal.      Left Ear: External ear normal.      Nose: Nose normal.   Eyes: Conjunctiva/sclera: Conjunctivae normal.      Pupils: Pupils are equal, round, and reactive to light. Cardiovascular:      Rate and Rhythm: Normal rate and regular rhythm. Heart sounds: Normal heart sounds, S1 normal and S2 normal. No murmur heard. No friction rub. No gallop. Pulmonary:      Effort: Pulmonary effort is normal. No respiratory distress. Breath sounds: Normal breath sounds. No stridor, decreased air movement or transmitted upper airway sounds. No decreased breath sounds, wheezing, rhonchi or rales. Abdominal:      General: Bowel sounds are normal.      Palpations: Abdomen is soft. Musculoskeletal:         General: Normal range of motion. Cervical back: Normal range of motion and neck supple. Lymphadenopathy:      Cervical: No cervical adenopathy. Skin:     General: Skin is warm and dry. Findings: No rash. Neurological:      Mental Status: She is alert and oriented to person, place, and time. Cranial Nerves: No cranial nerve deficit. Coordination: Coordination normal.      Deep Tendon Reflexes: Reflexes are normal and symmetric. Psychiatric:         Thought Content: Thought content normal.       /73 (Site: Left Upper Arm, Position: Sitting, Cuff Size: Large Adult)   Pulse 73   Temp 97.3 °F (36.3 °C) (Infrared)   Ht 5' 7\" (1.702 m)   Wt 191 lb (86.6 kg)   SpO2 99%   BMI 29.91 kg/m²     Results for POC orders placed in visit on 08/23/21   POCT rapid strep A   Result Value Ref Range    Strep A Ag None Detected None Detected     Assessment:       Diagnosis Orders   1. Sore throat  POCT rapid strep A    Respiratory Panel, Molecular, with COVID-19    Culture, Throat   2. Cough  Respiratory Panel, Molecular, with COVID-19    Culture, Throat   3. Generalized abdominal pain  POCT rapid strep A    Respiratory Panel, Molecular, with COVID-19   4.  Suspected COVID-19 virus infection  Respiratory Panel, Molecular, with COVID-19           Plan:     1.) POCT Strep- negative   2.) Throat CX sent to lab- will call with results and [place] accordingly   3.) Resp panel obtained and sent to lab- will call with results and [address] accordingly   4.) Symptom management PRN   5.) Remain in quarantine as recommended by CDC while waiting for Covid results   6.) RTO PRN   7.) Follow-up with PCP     Problem List     None         Patient given educationalmaterials - see patient instructions. Discussed use, benefit, and side effectsof prescribed medications. All patient questions answered. Pt verbalized understanding. Instructed to continue current medications, diet and exercise. Patient agreedwith treatment plan. Follow up as directed.      Electronically signed by ANDREW Inman CNP on 8/23/2021 at 10:00 AM

## 2021-08-24 LAB
ADENOVIRUS PCR: NOT DETECTED
BORDETELLA PARAPERTUSSIS: NOT DETECTED
BORDETELLA PERTUSSIS PCR: NOT DETECTED
CHLAMYDIA PNEUMONIAE BY PCR: NOT DETECTED
CORONAVIRUS 229E PCR: NOT DETECTED
CORONAVIRUS HKU1 PCR: NOT DETECTED
CORONAVIRUS NL63 PCR: NOT DETECTED
CORONAVIRUS OC43 PCR: NOT DETECTED
HUMAN METAPNEUMOVIRUS PCR: NOT DETECTED
INFLUENZA A BY PCR: NOT DETECTED
INFLUENZA A H1 (2009) PCR: NORMAL
INFLUENZA A H1 PCR: NORMAL
INFLUENZA A H3 PCR: NORMAL
INFLUENZA B BY PCR: NOT DETECTED
MYCOPLASMA PNEUMONIAE PCR: NOT DETECTED
PARAINFLUENZA 1 PCR: NOT DETECTED
PARAINFLUENZA 2 PCR: NOT DETECTED
PARAINFLUENZA 3 PCR: NOT DETECTED
PARAINFLUENZA 4 PCR: NOT DETECTED
RESP SYNCYTIAL VIRUS PCR: NOT DETECTED
RHINO/ENTEROVIRUS PCR: NOT DETECTED
SARS-COV-2, PCR: NOT DETECTED
SPECIMEN DESCRIPTION: NORMAL

## 2021-08-25 LAB
CULTURE: NORMAL
Lab: NORMAL
SPECIMEN DESCRIPTION: NORMAL

## 2022-01-18 ENCOUNTER — HOSPITAL ENCOUNTER (OUTPATIENT)
Facility: CLINIC | Age: 52
Discharge: HOME | End: 2022-01-20
Payer: COMMERCIAL

## 2022-01-18 ENCOUNTER — HOSPITAL ENCOUNTER (OUTPATIENT)
Dept: GENERAL RADIOLOGY | Facility: CLINIC | Age: 52
Discharge: HOME | End: 2022-01-20
Payer: COMMERCIAL

## 2022-01-18 ENCOUNTER — HOSPITAL ENCOUNTER (EMERGENCY)
Age: 52
Discharge: HOME | End: 2022-01-19
Attending: STUDENT IN AN ORGANIZED HEALTH CARE EDUCATION/TRAINING PROGRAM
Payer: COMMERCIAL

## 2022-01-18 ENCOUNTER — APPOINTMENT (OUTPATIENT)
Dept: GENERAL RADIOLOGY | Age: 52
End: 2022-01-18
Payer: COMMERCIAL

## 2022-01-18 VITALS
RESPIRATION RATE: 18 BRPM | HEART RATE: 71 BPM | HEIGHT: 67 IN | OXYGEN SATURATION: 97 % | DIASTOLIC BLOOD PRESSURE: 63 MMHG | BODY MASS INDEX: 29.03 KG/M2 | SYSTOLIC BLOOD PRESSURE: 102 MMHG | WEIGHT: 185 LBS | TEMPERATURE: 98.7 F

## 2022-01-18 DIAGNOSIS — S30.0XXA CONTUSION OF LOWER BACK, INITIAL ENCOUNTER: ICD-10-CM

## 2022-01-18 DIAGNOSIS — M54.50 ACUTE RIGHT-SIDED LOW BACK PAIN WITHOUT SCIATICA: ICD-10-CM

## 2022-01-18 DIAGNOSIS — S30.0XXA COCCYGEAL CONTUSION, INITIAL ENCOUNTER: ICD-10-CM

## 2022-01-18 DIAGNOSIS — S70.00XA CONTUSION OF HIP, UNSPECIFIED LATERALITY, INITIAL ENCOUNTER: ICD-10-CM
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
## 2022-01-18 PROCEDURE — 72100 X-RAY EXAM L-S SPINE 2/3 VWS: CPT

## 2022-01-18 PROCEDURE — 73521 X-RAY EXAM HIPS BI 2 VIEWS: CPT

## 2022-01-18 PROCEDURE — 72220 X-RAY EXAM SACRUM TAILBONE: CPT

## 2022-01-18 PROCEDURE — 99283 EMERGENCY DEPT VISIT LOW MDM: CPT

## 2022-01-18 ASSESSMENT — PAIN SCALES - GENERAL: PAINLEVEL_OUTOF10: 1

## 2022-01-19 PROCEDURE — 6370000000 HC RX 637 (ALT 250 FOR IP): Performed by: STUDENT IN AN ORGANIZED HEALTH CARE EDUCATION/TRAINING PROGRAM

## 2022-01-19 RX ORDER — HYDROCODONE BITARTRATE AND ACETAMINOPHEN 5; 325 MG/1; MG/1
1 TABLET ORAL ONCE
Status: COMPLETED | OUTPATIENT
Start: 2022-01-19 | End: 2022-01-19

## 2022-01-19 RX ADMIN — HYDROCODONE BITARTRATE AND ACETAMINOPHEN 1 TABLET: 5; 325 TABLET ORAL at 01:05

## 2022-01-19 ASSESSMENT — PAIN SCALES - GENERAL: PAINLEVEL_OUTOF10: 3

## 2022-01-19 ASSESSMENT — ENCOUNTER SYMPTOMS
ALLERGIC/IMMUNOLOGIC NEGATIVE: 1
DIARRHEA: 0
ABDOMINAL PAIN: 0
ABDOMINAL DISTENTION: 0
NAUSEA: 0
RESPIRATORY NEGATIVE: 1
VOMITING: 0

## 2022-01-19 NOTE — ED PROVIDER NOTES
[address]  Emergency Department Encounter  Emergency Medicine Resident     Pt Name: Karmen Jaramillo  RFL:[id number]  [place] 1970  Date of evaluation: 1/19/22  PCP:  Goi Ham, [address] [place] [address]       Chief Complaint   Patient presents with   Violet Flicker    Hip Pain       HISTORY OF PRESENT ILLNESS  (Location/Symptom, Timing/Onset, Context/Setting, Quality, Duration, ModifyingFactors, Severity.)      Karmen Jaramillo is a 45 y.o. female presents emergency department for evaluation of bilateral hip pain and pain in her coccyx. Patient states that she fell down the stairs today and went to her primary care physician who took x-rays. Patient states that she had the x-rays completed however they were going. For another 48 hours. Patient presents with increasing amount of hip pain bilaterally. Patient however is able to walk and states that she has not taking any pain control for this. PAST MEDICAL / SURGICAL / SOCIAL /FAMILY HISTORY      has a past medical history of Asthma, Connective tissue disorder ([address] Depression, Irritable bowel syndrome with diarrhea, and PONV (postoperative nausea and vomiting). No other pertinent PMH on review with patient/guardian. has a past surgical history that includes Hand surgery; Tubal ligation; Bunionectomy; Leg Tendon Surgery; pre-malignant / benign skin lesion excision; Colonoscopy (10/30/2020); and Colonoscopy (N/A, 10/30/2020). No other pertinent PSH on review with patient/guardian.   Social History     Socioeconomic History    Marital status:      Spouse name: Not on file    Number of children: Not on file    Years of education: Not on file    Highest education level: Not on file   Occupational History    Not on file   Tobacco Use    Smoking status: Never Smoker    Smokeless tobacco: Never Used   Vaping Use    Vaping Use: Never used   Substance and Sexual Activity    Alcohol use: Yes     Comment: once a week    Drug use: No    Sexual activity: Not on file   Other Topics Concern    Not on file   Social History Narrative    Not on file     Social Determinants of Health     Financial Resource Strain: Low Risk     Difficulty of Paying Living Expenses: Not hard at all   Food Insecurity: No Food Insecurity    Worried About Running Out of Food in the Last Year: Never true    [address] in the Last Year: Never true   Transportation Needs:     Lack of Transportation (Medical): Not on file    Lack of Transportation (Non-Medical): Not on file   Physical Activity:     Days of Exercise per Week: Not on file    Minutes of Exercise per Session: Not on file   Stress:     Feeling of Stress : Not on file   Social Connections:     Frequency of Communication with Friends and Family: Not on file    Frequency of Social Gatherings with Friends and Family: Not on file    Attends Holiness Services: Not on file    Active Member of [address] [place] [address] [place] or Organizations: Not on file    Attends Club or Organization Meetings: Not on file    Marital Status: Not on file   Intimate Partner Violence:     Fear of Current or Ex-Partner: Not on file    Emotionally Abused: Not on file    Physically Abused: Not on file    Sexually Abused: Not on file   Housing Stability:     Unable to Pay for Housing in the Last Year: Not on file    Number of [place] in the Last Year: Not on file    Unstable Housing in the Last Year: Not on file       I counseled the patient against using tobacco products. History reviewed. No pertinent family history. No other pertinent FamHx on review with patient/guardian. Allergies:  Adhesive tape and Pcn [penicillins]    Home Medications:  Prior to Admission medications    Medication Sig Start Date End Date Taking?  Authorizing Provider   chlorzoxazone (PARAFON FORTE) 500 MG tablet Take 1 tablet by mouth 4 times daily as needed for Muscle spasms 1/18/22 1/28/22  ANDREW Schmidt - MILLIE   ibuprofen (ADVIL;MOTRIN) 800 MG tablet Take 1 tablet by mouth every 8 hours as needed for Pain 1/18/22   Yetta Schilder, APRN - CNP   celecoxib (CELEBREX) 200 MG capsule TAKE 1 CAPSULE BY MOUTH 1 TO 2 TIMES DAILY WITH FOOD  Patient not taking: Reported on 1/18/2022 8/2/21   Historical Provider, MD   Multiple Vitamins-Minerals (THERAPEUTIC MULTIVITAMIN-MINERALS) tablet Take 1 tablet by mouth daily  Patient not taking: Reported on 8/23/2021    Historical Provider, MD   vitamin C (ASCORBIC ACID) 500 MG tablet Take 600 mg by mouth daily   Patient not taking: Reported on 8/23/2021    Historical Provider, MD   ZINC GLUCONATE PO Take 60 mg by mouth daily   Patient not taking: Reported on 8/23/2021    Historical Provider, MD   Glucosamine-Chondroit-Vit C-Mn (GLUCOSAMINE 1500 COMPLEX) CAPS Take 1 capsule by mouth daily  Patient not taking: Reported on 8/23/2021    Historical Provider, MD   albuterol sulfate HFA (PROVENTIL HFA) 108 (90 Base) MCG/ACT inhaler Inhale 2 puffs into the lungs every 4 hours as needed for Wheezing  Patient not taking: Reported on 1/18/2022 4/9/18   Kevin Lund MD       REVIEW OF SYSTEMS    (2-9 systems for level 4, 10 ormore for level 5)      Review of Systems   Constitutional: Negative for activity change, appetite change and fever. HENT: Negative. Respiratory: Negative. Cardiovascular: Negative. Gastrointestinal: Negative for abdominal distention, abdominal pain, diarrhea, nausea and vomiting. Genitourinary: Negative. Musculoskeletal: Positive for gait problem. Skin: Negative. Allergic/Immunologic: Negative. Psychiatric/Behavioral: Negative. PHYSICAL EXAM   (up to 7 for level 4, 8 or more for level 5)      INITIAL VITALS:   /63   Pulse 71   Temp 98.7 °F (37.1 °C) (Oral)   Resp 18   Ht 5' 7\" (1.702 m)   Wt 185 lb (83.9 kg)   SpO2 97%   BMI 28.98 kg/m²     Physical Exam  Vitals reviewed. HENT:      Head: Normocephalic and atraumatic.       Nose: Nose normal.      Mouth/Throat:      Mouth: Mucous membranes are moist.      Pharynx: Oropharynx is clear. Eyes:      Extraocular Movements: Extraocular movements intact. Conjunctiva/sclera: Conjunctivae normal.      Pupils: Pupils are equal, round, and reactive to light. Cardiovascular:      Rate and Rhythm: Normal rate and regular rhythm. Pulses: Normal pulses. Heart sounds: Normal heart sounds. Pulmonary:      Effort: Pulmonary effort is normal.      Breath sounds: Normal breath sounds. Abdominal:      General: Abdomen is flat. Palpations: Abdomen is soft. Musculoskeletal:      Cervical back: Normal range of motion and neck supple. Comments: Numbness of the hips bilaterally, more pain felt in the patient's coccyx area with movement. No laxity of the hips. No pain with varus or valgus stress   Neurological:      General: No focal deficit present. Mental Status: She is alert. Mental status is at baseline. Psychiatric:         Mood and Affect: Mood normal.         DIFFERENTIAL  DIAGNOSIS     DDX: Bilateral hip pain, coccyx fracture, pelvic injury    PLAN (LABS / IMAGING / EKG):  Orders Placed This Encounter   Procedures    XR HIP BILATERAL W AP PELVIS (2 VIEWS)       MEDICATIONS ORDERED:  Orders Placed This Encounter   Medications    HYDROcodone-acetaminophen ([address]) 5-325 MG per tablet 1 tablet           DIAGNOSTIC RESULTS / EMERGENCY DEPARTMENT COURSE / MDM     LABS:  No results found for this visit on 01/18/22. IMPRESSION/MDM/ED COURSE:  45 y.o. female presented with pain in her pelvic girdle and bilateral hip pains. Patient has had x-rays done of her coccyx and pelvis in addition with lumbar. We'll add on hips bilateral to complete the area. Likely patient will be able to be discharged home as she was able to ambulate on her own. X-rays were looked at in the room with the patient.   No official radiology read however on initial observation no acute or large fracture could be appreciated. Patient's pain is well controlled at this time. If negative findings will discharge home if positive will have patient follow-up with Ortho. ED Course as of 01/21/22 0106   Wed Jan 19, 2022   0051 Patient's hip x-rays were negative bilaterally. [facility] radiology for official read on patient's coccyx and lumbar films. We'll discharge the patient once films are read. [ES]   [id number] Waiting on films to be read. Patient was signed out to Dr. Aleks Land [ES]      ED Course User Index  [ES] Tyra Campbell MD        Patient/Guardian requesting discharge. Patient/Guardian was given written and verbal instructions prior to discharge. Patient/Guardian understood and agreed. Patient/Guardian had no further questions. RADIOLOGY:  XR HIP BILATERAL W AP PELVIS (2 VIEWS)   Final Result   1. No evidence of an acute fracture involving the right or left hip   2. Deformity involving the left femur, related to prior injury. EKG  None    All EKG's are interpreted by the Emergency Department Physician who either signs or Co-signs this chart in the absence of a cardiologist.      PROCEDURES:  None    CONSULTS:  None        FINAL IMPRESSION      1. Fall, initial encounter    2. Contusion of hip, unspecified laterality, initial encounter    3.  Contusion of lower back, initial encounter          DISPOSITION / PLAN       DISPOSITION          PATIENT REFERREDTO:  Shari Begum, APRN - CNP  [address] [facility]  [address] [facility] [address]  [address] [facility] [address]  [phone]    Call   As needed      DISCHARGE MEDICATIONS:  Discharge Medication List as of 1/19/2022  1:50 AM          Tyra Campbell MD  PGY 2  Resident Physician Emergency Medicine  01/21/22 1:06 AM        (Please note that portions of this note were completed with a voice recognition program.Efforts were made to edit the dictations but occasionally words are mis-transcribed.)        Tyra Campbell MD  Resident  01/19/22 4911 Hasmukh Gotti MD  01/21/22 [id number]

## 2022-01-19 NOTE — ED TRIAGE NOTES
Mode of arrival (squad #, walk in, police, etc) : walk in        Chief complaint(s): fall, hip pain        Arrival Note (brief scenario, treatment PTA, etc). : pt. Reports she fell around 10 am today. Pt. States she had a well visit with her PCP who ordered outpatient x-rays. Pt. States she got the x-rays taken but was told they wont be read for another 24-48 hours. Pt. States the pain has been increasingly worse throughout the day and she was unable to sleep due to the pain in her tailbone and right hip. C= \"Have you ever felt that you should Cut down on your drinking? \"  No  A= \"Have people Annoyed you by criticizing your drinking? \"  No  G= \"Have you ever felt bad or Guilty about your drinking? \"  No  E= \"Have you ever had a drink as an Eye-opener first thing in the morning to steady your nerves or to help a hangover? \"  No      Deferred []      Reason for deferring: N/A    *If yes to two or more: probable alcohol abuse. *

## 2022-03-08 ENCOUNTER — HOSPITAL ENCOUNTER (OUTPATIENT)
Dept: WOMENS IMAGING | Age: 52
Discharge: HOME | End: 2022-03-10
Payer: COMMERCIAL

## 2022-03-08 DIAGNOSIS — Z12.31 ENCOUNTER FOR SCREENING MAMMOGRAM FOR MALIGNANT NEOPLASM OF BREAST: ICD-10-CM

## 2022-03-08 PROCEDURE — 77063 BREAST TOMOSYNTHESIS BI: CPT

## 2022-03-11 ENCOUNTER — HOSPITAL ENCOUNTER (OUTPATIENT)
Age: 52
Setting detail: SPECIMEN
Discharge: HOME | End: 2022-03-11

## 2022-03-11 DIAGNOSIS — Z13.9 SCREENING FOR CONDITION: ICD-10-CM

## 2022-03-11 DIAGNOSIS — Z13.220 SCREENING, LIPID: ICD-10-CM

## 2022-03-11 LAB
ALBUMIN SERPL-MCNC: 4.4 G/DL (ref 3.5–5.2)
ALBUMIN/GLOBULIN RATIO: 1.8 (ref 1–2.5)
ALP BLD-CCNC: 58 U/L (ref 35–104)
ALT SERPL-CCNC: 14 U/L (ref 5–33)
ANION GAP SERPL CALCULATED.3IONS-SCNC: 14 MMOL/L (ref 9–17)
AST SERPL-CCNC: 18 U/L
BILIRUB SERPL-MCNC: 0.58 MG/DL (ref 0.3–1.2)
BUN BLDV-MCNC: 15 MG/DL (ref 6–20)
CALCIUM SERPL-MCNC: 9.8 MG/DL (ref 8.6–10.4)
CHLORIDE BLD-SCNC: 102 MMOL/L (ref 98–107)
CHOLESTEROL, FASTING: 146 MG/DL
CHOLESTEROL/HDL RATIO: 2.2
CO2: 24 MMOL/L (ref 20–31)
CREAT SERPL-MCNC: 0.67 MG/DL (ref 0.5–0.9)
GFR AFRICAN AMERICAN: >60 ML/MIN
GFR NON-AFRICAN AMERICAN: >60 ML/MIN
GFR SERPL CREATININE-BSD FRML MDRD: NORMAL ML/MIN/{1.73_M2}
GLUCOSE BLD-MCNC: 82 MG/DL (ref 70–99)
HCT VFR BLD CALC: 41 % (ref 36.3–47.1)
HDLC SERPL-MCNC: 66 MG/DL
HEMOGLOBIN: 13.2 G/DL (ref 11.9–15.1)
LDL CHOLESTEROL: 63 MG/DL (ref 0–130)
MCH RBC QN AUTO: 29.9 PG (ref 25.2–33.5)
MCHC RBC AUTO-ENTMCNC: 32.2 G/DL (ref 28.4–34.8)
MCV RBC AUTO: 92.8 FL (ref 82.6–102.9)
NRBC AUTOMATED: 0 PER 100 WBC
PDW BLD-RTO: 12.4 % (ref 11.8–14.4)
PLATELET # BLD: 206 K/UL (ref 138–453)
PMV BLD AUTO: 11.3 FL (ref 8.1–13.5)
POTASSIUM SERPL-SCNC: 4.3 MMOL/L (ref 3.7–5.3)
RBC # BLD: 4.42 M/UL (ref 3.95–5.11)
SODIUM BLD-SCNC: 140 MMOL/L (ref 135–144)
TOTAL PROTEIN: 6.9 G/DL (ref 6.4–8.3)
TRIGLYCERIDE, FASTING: 83 MG/DL
WBC # BLD: 4.6 K/UL (ref 3.5–11.3)

## 2023-01-09 ENCOUNTER — HOSPITAL ENCOUNTER (OUTPATIENT)
Dept: GENERAL RADIOLOGY | Facility: CLINIC | Age: 53
Discharge: HOME | End: 2023-01-11
Payer: COMMERCIAL

## 2023-01-09 ENCOUNTER — HOSPITAL ENCOUNTER (OUTPATIENT)
Facility: CLINIC | Age: 53
Discharge: HOME | End: 2023-01-11
Payer: COMMERCIAL

## 2023-01-09 DIAGNOSIS — S49.92XA INJURY OF LEFT SHOULDER, INITIAL ENCOUNTER: ICD-10-CM

## 2023-01-09 PROCEDURE — 73030 X-RAY EXAM OF SHOULDER: CPT

## 2023-03-20 ENCOUNTER — OFFICE VISIT (OUTPATIENT)
Dept: ORTHOPEDIC SURGERY | Age: 53
End: 2023-03-20
Payer: COMMERCIAL

## 2023-03-20 VITALS — RESPIRATION RATE: 14 BRPM | WEIGHT: 193 LBS | HEIGHT: 67 IN | BODY MASS INDEX: 30.29 KG/M2

## 2023-03-20 DIAGNOSIS — M25.512 LEFT SHOULDER PAIN, UNSPECIFIED CHRONICITY: Primary | ICD-10-CM

## 2023-03-20 PROCEDURE — 20610 DRAIN/INJ JOINT/BURSA W/O US: CPT | Performed by: ORTHOPAEDIC SURGERY

## 2023-03-20 PROCEDURE — 99203 OFFICE O/P NEW LOW 30 MIN: CPT | Performed by: ORTHOPAEDIC SURGERY

## 2023-03-20 RX ORDER — LIDOCAINE HYDROCHLORIDE 10 MG/ML
3 INJECTION, SOLUTION INFILTRATION; PERINEURAL ONCE
Status: COMPLETED | OUTPATIENT
Start: 2023-03-20 | End: 2023-03-20

## 2023-03-20 RX ORDER — TRIAMCINOLONE ACETONIDE 40 MG/ML
40 INJECTION, SUSPENSION INTRA-ARTICULAR; INTRAMUSCULAR ONCE
Status: COMPLETED | OUTPATIENT
Start: 2023-03-20 | End: 2023-03-20

## 2023-03-20 RX ADMIN — LIDOCAINE HYDROCHLORIDE 3 ML: 10 INJECTION, SOLUTION INFILTRATION; PERINEURAL at 15:57

## 2023-03-20 RX ADMIN — TRIAMCINOLONE ACETONIDE 40 MG: 40 INJECTION, SUSPENSION INTRA-ARTICULAR; INTRAMUSCULAR at 15:58

## 2024-02-06 ENCOUNTER — OFFICE VISIT (OUTPATIENT)
Age: 54
End: 2024-02-06
Payer: COMMERCIAL

## 2024-02-06 VITALS — BODY MASS INDEX: 29.03 KG/M2 | WEIGHT: 185 LBS | HEIGHT: 67 IN

## 2024-02-06 DIAGNOSIS — M79.642 LEFT HAND PAIN: Primary | ICD-10-CM

## 2024-02-06 PROCEDURE — 99213 OFFICE O/P EST LOW 20 MIN: CPT | Performed by: NURSE PRACTITIONER

## 2024-02-08 NOTE — PROGRESS NOTES
during the day to protect the finger but encouraged to start working on some motion. Patient may follow up as needed.          Past History:    Current Outpatient Medications:     hydroxychloroquine (PLAQUENIL) 200 MG tablet, Take 1 tablet by mouth daily, Disp: , Rfl:     albuterol sulfate HFA (PROVENTIL;VENTOLIN;PROAIR) 108 (90 Base) MCG/ACT inhaler, 2 puffs every 6 hours as needed for Wheezing, Disp: , Rfl:   Allergies   Allergen Reactions    Adhesive Tape     Hydroxychloroquine Sulfate      Other Reaction(s): vomiting, muscle cramps    Pcn [Penicillins]      Social History     Socioeconomic History    Marital status:      Spouse name: Not on file    Number of children: Not on file    Years of education: Not on file    Highest education level: Not on file   Occupational History    Not on file   Tobacco Use    Smoking status: Never    Smokeless tobacco: Never   Vaping Use    Vaping Use: Never used   Substance and Sexual Activity    Alcohol use: Yes     Comment: once a week    Drug use: No    Sexual activity: Not on file   Other Topics Concern    Not on file   Social History Narrative    Not on file     Social Determinants of Health     Financial Resource Strain: Low Risk  (1/19/2023)    Overall Financial Resource Strain (CARDIA)     Difficulty of Paying Living Expenses: Not hard at all   Food Insecurity: Not on file (1/19/2023)   Transportation Needs: No Transportation Needs (2/24/2020)    PRAPARE - Transportation     Lack of Transportation (Medical): No     Lack of Transportation (Non-Medical): No   Physical Activity: Not on file   Stress: Not on file   Social Connections: Not on file   Intimate Partner Violence: Not on file   Housing Stability: Not on file     Past Medical History:   Diagnosis Date    Asthma 12/16/2014    Connective tissue disorder (HCC)     Depression     Irritable bowel syndrome with diarrhea 6/29/2016    PONV (postoperative nausea and vomiting)      Past Surgical History:   Procedure

## 2024-04-11 ENCOUNTER — OFFICE VISIT (OUTPATIENT)
Dept: FAMILY MEDICINE CLINIC | Age: 54
End: 2024-04-11
Payer: COMMERCIAL

## 2024-04-11 VITALS
TEMPERATURE: 98.3 F | WEIGHT: 190 LBS | HEART RATE: 73 BPM | OXYGEN SATURATION: 98 % | BODY MASS INDEX: 29.82 KG/M2 | HEIGHT: 67 IN | SYSTOLIC BLOOD PRESSURE: 121 MMHG | DIASTOLIC BLOOD PRESSURE: 67 MMHG

## 2024-04-11 DIAGNOSIS — H65.02 NON-RECURRENT ACUTE SEROUS OTITIS MEDIA OF LEFT EAR: Primary | ICD-10-CM

## 2024-04-11 DIAGNOSIS — J06.9 URI WITH COUGH AND CONGESTION: ICD-10-CM

## 2024-04-11 PROCEDURE — 99213 OFFICE O/P EST LOW 20 MIN: CPT

## 2024-04-11 RX ORDER — CEFDINIR 300 MG/1
300 CAPSULE ORAL 2 TIMES DAILY
Qty: 14 CAPSULE | Refills: 0 | Status: SHIPPED | OUTPATIENT
Start: 2024-04-11 | End: 2024-04-18

## 2024-04-11 RX ORDER — METHYLPREDNISOLONE 4 MG/1
TABLET ORAL
Qty: 1 KIT | Refills: 0 | Status: SHIPPED | OUTPATIENT
Start: 2024-04-11 | End: 2024-04-17

## 2024-04-11 ASSESSMENT — ENCOUNTER SYMPTOMS
WHEEZING: 0
EYE DISCHARGE: 0
SORE THROAT: 0
SHORTNESS OF BREATH: 0
COUGH: 1

## 2024-04-11 NOTE — PROGRESS NOTES
[facility]  [address] [address]    [facility]  [address]  [address]  [facility] [address]  Dept: [phone]    Justino Kendrick is a 53 y.o. female Established patient, who presents to the walk-in clinic today with conditions/complaints as noted below:    Chief Complaint   Patient presents with    Otalgia     Left ear pain started Sunday  and a fever 100 for 1 week          HPI:     Patient is a 53-year-old female that presents today for acute illness. States that she's been sick since last Thursday. She's had low-grade fevers on-and-off along with chills and body aches. Reports sinus congestion, post-nasal drainage, and left ear pain. No ear discharge or sore throat. She has a cough as well. Denies wheezing or dyspnea. She's been using Flonase without much relief.        Past Medical History:   Diagnosis Date    Asthma 12/16/2014    Connective tissue disorder (HCC)     Depression     Irritable bowel syndrome with diarrhea 6/29/2016    PONV (postoperative nausea and vomiting)        Current Outpatient Medications   Medication Sig Dispense Refill    cefdinir (OMNICEF) 300 MG capsule Take 1 capsule by mouth 2 times daily for 7 days 14 capsule 0    methylPREDNISolone (MEDROL DOSEPACK) 4 MG tablet Take by mouth. 1 kit 0    CYMBALTA 60 MG extended release capsule Take 1 capsule by mouth daily      hydroxychloroquine (PLAQUENIL) 200 MG tablet Take 1 tablet by mouth daily      albuterol sulfate HFA (PROVENTIL;VENTOLIN;PROAIR) 108 (90 Base) MCG/ACT inhaler 2 puffs every 6 hours as needed for Wheezing       No current facility-administered medications for this visit.       Allergies   Allergen Reactions    Adhesive Tape     Pcn [Penicillins]        :     Review of Systems   Constitutional:  Positive for chills and fever

## 2024-04-11 NOTE — PATIENT INSTRUCTIONS
Finish the entire course of antibiotic treatment.  Complete course of oral steroid taper.  Follow-up as needed.

## 2024-04-15 ENCOUNTER — HOSPITAL ENCOUNTER (OUTPATIENT)
Age: 54
Setting detail: SPECIMEN
Discharge: HOME | End: 2024-04-15

## 2024-04-15 DIAGNOSIS — Z13.220 SCREENING FOR HYPERLIPIDEMIA: ICD-10-CM

## 2024-04-15 DIAGNOSIS — Z13.1 SCREENING FOR DIABETES MELLITUS: ICD-10-CM

## 2024-04-15 DIAGNOSIS — R53.83 OTHER FATIGUE: ICD-10-CM

## 2024-04-15 LAB
ALBUMIN SERPL-MCNC: 4.7 G/DL (ref 3.5–5.2)
ALBUMIN/GLOB SERPL: 1 {RATIO} (ref 1–2.5)
ALP SERPL-CCNC: 73 U/L (ref 35–104)
ALT SERPL-CCNC: 18 U/L (ref 10–35)
ANION GAP SERPL CALCULATED.3IONS-SCNC: 13 MMOL/L (ref 9–16)
AST SERPL-CCNC: 22 U/L (ref 10–35)
BASOPHILS # BLD: 0.04 K/UL (ref 0–0.2)
BASOPHILS NFR BLD: 1 % (ref 0–2)
BILIRUB SERPL-MCNC: 0.7 MG/DL (ref 0–1.2)
BUN SERPL-MCNC: 17 MG/DL (ref 6–20)
CALCIUM SERPL-MCNC: 10.1 MG/DL (ref 8.6–10.4)
CHLORIDE SERPL-SCNC: 103 MMOL/L (ref 98–107)
CHOLEST SERPL-MCNC: 167 MG/DL (ref 0–199)
CHOLESTEROL/HDL RATIO: 2
CO2 SERPL-SCNC: 24 MMOL/L (ref 20–31)
CREAT SERPL-MCNC: 0.8 MG/DL (ref 0.5–0.9)
EOSINOPHIL # BLD: 0.08 K/UL (ref 0–0.44)
EOSINOPHILS RELATIVE PERCENT: 1 % (ref 1–4)
ERYTHROCYTE [DISTWIDTH] IN BLOOD BY AUTOMATED COUNT: 14.6 % (ref 11.8–14.4)
EST. AVERAGE GLUCOSE BLD GHB EST-MCNC: 108 MG/DL
GFR SERPL CREATININE-BSD FRML MDRD: 83 ML/MIN/1.73M2
GLUCOSE SERPL-MCNC: 86 MG/DL (ref 74–99)
HBA1C MFR BLD: 5.4 % (ref 4–6)
HCT VFR BLD AUTO: 42.1 % (ref 36.3–47.1)
HDLC SERPL-MCNC: 82 MG/DL
HGB BLD-MCNC: 13.2 G/DL (ref 11.9–15.1)
IMM GRANULOCYTES # BLD AUTO: 0.04 K/UL (ref 0–0.3)
IMM GRANULOCYTES NFR BLD: 1 %
LDLC SERPL CALC-MCNC: 70 MG/DL (ref 0–100)
LYMPHOCYTES NFR BLD: 1.98 K/UL (ref 1.1–3.7)
LYMPHOCYTES RELATIVE PERCENT: 27 % (ref 24–43)
MCH RBC QN AUTO: 25.8 PG (ref 25.2–33.5)
MCHC RBC AUTO-ENTMCNC: 31.4 G/DL (ref 28.4–34.8)
MCV RBC AUTO: 82.2 FL (ref 82.6–102.9)
MONOCYTES NFR BLD: 0.57 K/UL (ref 0.1–1.2)
MONOCYTES NFR BLD: 8 % (ref 3–12)
NEUTROPHILS NFR BLD: 62 % (ref 36–65)
NEUTS SEG NFR BLD: 4.63 K/UL (ref 1.5–8.1)
NRBC BLD-RTO: 0 PER 100 WBC
PLATELET # BLD AUTO: 293 K/UL (ref 138–453)
PMV BLD AUTO: 10.4 FL (ref 8.1–13.5)
POTASSIUM SERPL-SCNC: 4.5 MMOL/L (ref 3.7–5.3)
PROT SERPL-MCNC: 8.2 G/DL (ref 6.6–8.7)
RBC # BLD AUTO: 5.12 M/UL (ref 3.95–5.11)
RBC # BLD: ABNORMAL 10*6/UL
SODIUM SERPL-SCNC: 140 MMOL/L (ref 136–145)
TRIGL SERPL-MCNC: 78 MG/DL
TSH SERPL DL<=0.05 MIU/L-ACNC: 0.73 UIU/ML (ref 0.27–4.2)
VLDLC SERPL CALC-MCNC: 16 MG/DL
WBC OTHER # BLD: 7.3 K/UL (ref 3.5–11.3)

## 2024-05-10 ENCOUNTER — HOSPITAL ENCOUNTER (OUTPATIENT)
Age: 54
Setting detail: SPECIMEN
Discharge: HOME | End: 2024-05-10

## 2024-05-14 LAB
HPV I/H RISK 4 DNA CVX QL NAA+PROBE: NOT DETECTED
HPV SAMPLE: NORMAL
HPV, INTERPRETATION: NORMAL
HPV16 DNA CVX QL NAA+PROBE: NOT DETECTED
HPV18 DNA CVX QL NAA+PROBE: NOT DETECTED
SPECIMEN DESCRIPTION: NORMAL

## 2024-05-18 LAB — CYTOLOGY REPORT: NORMAL

## 2024-07-17 ENCOUNTER — OFFICE VISIT (OUTPATIENT)
Dept: ORTHOPEDIC SURGERY | Age: 54
End: 2024-07-17
Payer: COMMERCIAL

## 2024-07-17 VITALS — WEIGHT: 192 LBS | BODY MASS INDEX: 30.13 KG/M2 | HEIGHT: 67 IN | RESPIRATION RATE: 15 BRPM

## 2024-07-17 DIAGNOSIS — M75.22 BICEPS TENDINITIS OF LEFT UPPER EXTREMITY: Primary | ICD-10-CM

## 2024-07-17 DIAGNOSIS — M25.512 LEFT SHOULDER PAIN, UNSPECIFIED CHRONICITY: ICD-10-CM

## 2024-07-17 PROCEDURE — 99213 OFFICE O/P EST LOW 20 MIN: CPT

## 2024-07-17 NOTE — PROGRESS NOTES
strength:    Right       Left    Deltoid   5    Deltoid   5  Supraspinatus  5    Supraspinatus  5  ER   5    ER   5  IR   5    IR   5    Special tests    Right   Rotator Cuff    Left    n   Painful arc    n   n   Pain with ER    n    n   Neer's     y    n   Hawkin's    y    n   Drop Arm    n  n   Lift off/Belly Press   n  n   ER Lag    n          AC Joint  n   AC tenderness   n  n   Cross-chest adduction  n       Labrum/biceps    n   Hadley's    Y (equivocal)   n   Biceps sheer    n      n   Speed's/Yergason's   n    n   Tenderness Biceps Groove  n    n   Gurvinder's    n         Instability  n   Ant Apprehension   n    n   Post Apprehension   n    n   Ant Load shift    n    n   Post Load shift   n   n   Sulcus     n  n   Generalized Laxity   n    Imaging:  Xrays: 4 views of the left shoulder obtained on 7/17/2024 were independently reviewed    Indications: Left shoulder pain    Findings: No obvious fracture, dislocation or subluxation.  Mild inferior glenohumeral joint space narrowing with a small osteophyte at the inferior humeral head.  Well-maintained acromioclavicular joint spacing.  Type II acromion.    Impression: Left shoulder radiographs demonstrating mild degenerative changes at the glenohumeral joint as outlined above.    Impression/Plan:     Justino Kendrick is a 53 y.o. old female who presented to clinic today for evaluation of left shoulder pain.  After evaluation does appear patient is dealing with a biceps tendinitis in the left shoulder.  I did also explain to her that she could have a mild rotator cuff tendinitis although on testing today she does have good strength and mild signs that would suggest rotator cuff involvement.  We did discuss these etiologies in depth including treatment options available to her both conservative and surgical.  At this time I would recommend proceeding conservatively with an ultrasound-guided injection into the left biceps tendon sheath.  She should keep track of how

## 2024-10-22 DIAGNOSIS — M25.552 LEFT HIP PAIN: Primary | ICD-10-CM

## 2024-10-23 ENCOUNTER — OFFICE VISIT (OUTPATIENT)
Dept: ORTHOPEDIC SURGERY | Age: 54
End: 2024-10-23
Payer: COMMERCIAL

## 2024-10-23 VITALS — BODY MASS INDEX: 30.29 KG/M2 | WEIGHT: 193 LBS | RESPIRATION RATE: 15 BRPM | OXYGEN SATURATION: 99 % | HEIGHT: 67 IN

## 2024-10-23 DIAGNOSIS — M25.552 LEFT HIP PAIN: Primary | ICD-10-CM

## 2024-10-23 PROCEDURE — 99213 OFFICE O/P EST LOW 20 MIN: CPT

## 2024-10-23 ASSESSMENT — ENCOUNTER SYMPTOMS
BACK PAIN: 0
COLOR CHANGE: 0

## 2024-10-23 NOTE — PATIENT INSTRUCTIONS
PATIENTIQ:  PatientIQ helps [facility] stay in touch with you to know how you're feeling, and provides education and care instructions to you at various time points.   Your answers help your care team track your progress to provide the best care possible. PatientIQ will contact you pre-op and post-op via email or text with:  Educational Videos and Care Instructions  Questionnaires About How You're Feeling    Your participation provides you valuable education and helps [facility] continue to provide quality care to all patients. Thank you

## 2024-10-23 NOTE — PROGRESS NOTES
[facility] PHYSICIANS [facility] ORTHOPEDICS AND SPORTS MEDICINE  [address]  [place] [address]  Dept: [phone]    Ambulatory Orthopedic New Patient Visit      CHIEF COMPLAINT:    Chief Complaint   Patient presents with    Hip Pain     Left hip pain- new patient       HISTORY OF PRESENT ILLNESS:      Date of Injury: no known injuries.     The patient is a 53 y.o. female who is being seen  for consultation and evaluation of her left hip pain.  She has had this pain off and on for several years now with no known injuries.  She locates the pain as being throughout the groin of the left hip.  She states that she was evaluated for the hip pain in the past by Dr. Banerjee and that she was told there was no arthritis.  She mostly notices the pain with rolling over in bed and with getting up from a seated position.  She states that she feels very stiff after sitting for prolonged periods of time.  She also states that the left hip often feels weak.  She is a  at [employer] and she states that she does not do much exercise outside of work.  She does do quite a bit of standing and going up and down steps at work.  Of note, she has a past medical history significant for lupus.  She is on Plaquenil and Cymbalta for this.      Past Medical History:    Past Medical History:   Diagnosis Date    Asthma 12/16/2014    Connective tissue disorder (HCC)     Depression     Irritable bowel syndrome with diarrhea 6/29/2016    PONV (postoperative nausea and vomiting)        Past Surgical History:    Past Surgical History:   Procedure Laterality Date    BUNIONECTOMY      COLONOSCOPY  10/30/2020    COLORECTAL CANCER SCREENING, NOT HIGH RISK (N/A )    COLONOSCOPY N/A 10/30/2020    COLORECTAL CANCER SCREENING, NOT HIGH RISK performed by Yang Romano MD at [facility] OR    FRACTURE SURGERY  1998    HAND SURGERY      with pin    LEG TENDON SURGERY

## 2024-11-06 ENCOUNTER — HOSPITAL ENCOUNTER (OUTPATIENT)
Dept: WOMENS IMAGING | Age: 54
Discharge: HOME | End: 2024-11-08
Payer: COMMERCIAL

## 2024-11-06 DIAGNOSIS — Z12.31 ENCOUNTER FOR SCREENING MAMMOGRAM FOR MALIGNANT NEOPLASM OF BREAST: ICD-10-CM

## 2024-11-06 PROCEDURE — 77063 BREAST TOMOSYNTHESIS BI: CPT

## 2025-05-14 ENCOUNTER — TELEPHONE (OUTPATIENT)
Age: 55
End: 2025-05-14

## (undated) DEVICE — Z DISCONTINUED USE 2751540 TUBING IRRIG L10IN DISP PMP ENDOGATOR

## (undated) DEVICE — ADAPTER,CATHETER/SYRINGE/LUER,STERILE: Brand: MEDLINE

## (undated) DEVICE — TUBING, SUCTION, 3/16" X 10', STRAIGHT: Brand: MEDLINE

## (undated) DEVICE — SIMPLICITY FLUFF UNDERPAD 23X36, MODERATE: Brand: SIMPLICITY

## (undated) DEVICE — GOWN ISOLATN XL YEL M WT SIDE TIE LEV 2

## (undated) DEVICE — SPONGE GZ W4XL4IN COT 4 PLY WVN

## (undated) DEVICE — BASIN EMSIS 700ML GRAPHITE PLAS KID SHP GRAD

## (undated) DEVICE — JELLY,LUBE,STERILE,FLIP TOP,TUBE,2-OZ: Brand: MEDLINE

## (undated) DEVICE — Device: Brand: DEFENDO VALVE AND CONNECTOR KIT

## (undated) DEVICE — SOLUTION IV IRRIG 1000ML POUR BTL 2F7114

## (undated) DEVICE — 4-PORT MANIFOLD: Brand: NEPTUNE 2

## (undated) DEVICE — CONNECTOR TBNG AUX H2O JET DISP FOR OLY 160/180 SER